# Patient Record
Sex: MALE | Race: WHITE | NOT HISPANIC OR LATINO | Employment: UNEMPLOYED | URBAN - METROPOLITAN AREA
[De-identification: names, ages, dates, MRNs, and addresses within clinical notes are randomized per-mention and may not be internally consistent; named-entity substitution may affect disease eponyms.]

---

## 2018-12-03 ENCOUNTER — OFFICE VISIT (OUTPATIENT)
Dept: FAMILY MEDICINE | Facility: CLINIC | Age: 43
End: 2018-12-03
Payer: COMMERCIAL

## 2018-12-03 VITALS
RESPIRATION RATE: 16 BRPM | DIASTOLIC BLOOD PRESSURE: 78 MMHG | WEIGHT: 201.6 LBS | BODY MASS INDEX: 25.87 KG/M2 | TEMPERATURE: 97.6 F | SYSTOLIC BLOOD PRESSURE: 133 MMHG | HEART RATE: 92 BPM | HEIGHT: 74 IN

## 2018-12-03 DIAGNOSIS — Z00.00 ENCOUNTER FOR WELL ADULT EXAM WITHOUT ABNORMAL FINDINGS: Primary | ICD-10-CM

## 2018-12-03 DIAGNOSIS — Z23 NEED FOR PROPHYLACTIC VACCINATION AND INOCULATION AGAINST INFLUENZA: ICD-10-CM

## 2018-12-03 DIAGNOSIS — F40.243 FEAR OF FLYING: ICD-10-CM

## 2018-12-03 LAB
CHOLEST SERPL-MCNC: 177 MG/DL
GLUCOSE SERPL-MCNC: 105 MG/DL (ref 70–99)
HDLC SERPL-MCNC: 50 MG/DL
LDLC SERPL CALC-MCNC: 107 MG/DL
NONHDLC SERPL-MCNC: 127 MG/DL
TRIGL SERPL-MCNC: 102 MG/DL

## 2018-12-03 PROCEDURE — 80061 LIPID PANEL: CPT | Performed by: NURSE PRACTITIONER

## 2018-12-03 PROCEDURE — 90686 IIV4 VACC NO PRSV 0.5 ML IM: CPT | Performed by: NURSE PRACTITIONER

## 2018-12-03 PROCEDURE — 99396 PREV VISIT EST AGE 40-64: CPT | Mod: 25 | Performed by: NURSE PRACTITIONER

## 2018-12-03 PROCEDURE — 36415 COLL VENOUS BLD VENIPUNCTURE: CPT | Performed by: NURSE PRACTITIONER

## 2018-12-03 PROCEDURE — 90471 IMMUNIZATION ADMIN: CPT | Performed by: NURSE PRACTITIONER

## 2018-12-03 PROCEDURE — 82947 ASSAY GLUCOSE BLOOD QUANT: CPT | Performed by: NURSE PRACTITIONER

## 2018-12-03 RX ORDER — CLONAZEPAM 0.5 MG/1
0.5 TABLET, ORALLY DISINTEGRATING ORAL 2 TIMES DAILY PRN
Qty: 10 TABLET | Refills: 0 | Status: SHIPPED | OUTPATIENT
Start: 2018-12-03 | End: 2020-06-25

## 2018-12-03 ASSESSMENT — ENCOUNTER SYMPTOMS
MYALGIAS: 0
EYE PAIN: 0
NAUSEA: 0
PARESTHESIAS: 0
ARTHRALGIAS: 0
SHORTNESS OF BREATH: 1
HEARTBURN: 0
COUGH: 0
JOINT SWELLING: 0
ABDOMINAL PAIN: 0
DIARRHEA: 0
HEMATOCHEZIA: 0
HEADACHES: 0
SORE THROAT: 0
CONSTIPATION: 0
DIZZINESS: 0
FREQUENCY: 0
FEVER: 0
CHILLS: 0
PALPITATIONS: 0
DYSURIA: 0
NERVOUS/ANXIOUS: 0
WEAKNESS: 0
HEMATURIA: 0

## 2018-12-03 NOTE — MR AVS SNAPSHOT
After Visit Summary   12/3/2018    Sunny Pruitt    MRN: [id number]           Patient Information     Date Of Birth          1975        Visit Information        Provider Department      12/3/2018 10:40 AM Joyce Jorgensen APRN [facility]        Today's Diagnoses     Encounter for well adult exam without abnormal findings    -  1    Fear of flying        Need for prophylactic vaccination and inoculation against influenza          Care Instructions      Preventive Health Recommendations  Male Ages 40 to 49    Yearly exam:             See your health care provider every year in order to  o   Review health changes.   o   Discuss preventive care.    o   Review your medicines if your doctor has prescribed any.    You should be tested each year for STDs (sexually transmitted diseases) if you re at risk.     Have a cholesterol test every 5 years.     Have a colonoscopy (test for colon cancer) if someone in your family has had colon cancer or polyps before age 50.     After age 45, have a diabetes test (fasting glucose). If you are at risk for diabetes, you should have this test every 3 years.      Talk with your health care provider about whether or not a prostate cancer screening test (PSA) is right for you.    Shots: Get a flu shot each year. Get a tetanus shot every 10 years.     Nutrition:    Eat at least 5 servings of fruits and vegetables daily.     Eat whole-grain bread, whole-wheat pasta and brown rice instead of white grains and rice.     Get adequate Calcium and Vitamin D.     Lifestyle    Exercise for at least 150 minutes a week (30 minutes a day, 5 days a week). This will help you control your weight and prevent disease.     Limit alcohol to one drink per day.     No smoking.     Wear sunscreen to prevent skin cancer.     See your dentist every six months for an exam and cleaning.              Follow-ups after your visit        Follow-up notes from your care team      "Return in about 1 year (around 12/3/2019) for Physical Exam.      Who to contact     If you have questions or need follow up information about today's clinic visit or your schedule please contact [facility] directly at [phone].  Normal or non-critical lab and imaging results will be communicated to you by MyChart, letter or phone within 4 business days after the clinic has received the results. If you do not hear from us within 7 days, please contact the clinic through [facility] or phone. If you have a critical or abnormal lab result, we will notify you by phone as soon as possible.  Submit refill requests through [employer] or call your pharmacy and they will forward the refill request to us. Please allow 3 business days for your refill to be completed.          Additional Information About Your Visit        MyChart Information     [employer] lets you send messages to your doctor, view your test results, renew your prescriptions, schedule appointments and more. To sign up, go to [email or web address] . Click on \"Log in\" on the left side of the screen, which will take you to the Welcome page. Then click on \"Sign up Now\" on the right side of the page.     You will be asked to enter the access code listed below, as well as some personal information. Please follow the directions to create your username and password.     Your access code is: [email or web address]  Expires: 3/3/2019 11:35 AM     Your access code will  in 90 days. If you need help or a new code, please call your [place] clinic or [phone].        Care EveryWhere ID     This is your Care EveryWhere ID. This could be used by other organizations to access your [place] medical records  [id number]        Your Vitals Were     Pulse Temperature Respirations Height BMI (Body Mass Index)       92 97.6  F (36.4  C) (Oral) 16 6' 2\" (1.88 m) 25.88 kg/m2        Blood Pressure from Last 3 Encounters:   18 133/78   16 102/74 "   06/04/16 120/72    Weight from Last 3 Encounters:   12/03/18 201 lb 9.6 oz (91.4 kg)   09/13/16 193 lb 6.4 oz (87.7 kg)   06/04/16 195 lb (88.5 kg)              We Performed the Following     FLU VACCINE, SPLIT VIRUS, IM (QUADRIVALENT) [90451]- >3 YRS     GLUCOSE     Lipid Profile (Chol, Trig, HDL, LDL calc)     Vaccine Administration, Initial [42134]          Today's Medication Changes          These changes are accurate as of 12/3/18 11:35 AM.  If you have any questions, ask your nurse or doctor.               Start taking these medicines.        Dose/Directions    clonazePAM 0.5 MG ODT   Commonly known as:  klonoPIN   Used for:  Fear of flying   Started by:  Jocye Jorgensen APRN CNP        Dose:  0.5 mg   Take 1 tablet (0.5 mg) by mouth 2 times daily as needed for anxiety   Quantity:  10 tablet   Refills:  0            Where to get your medicines      Some of these will need a paper prescription and others can be bought over the counter.  Ask your nurse if you have questions.     Bring a paper prescription for each of these medications     clonazePAM 0.5 MG ODT                Primary Care Provider Fax #    Physician No Ref-Primary [phone]       No address on file        Equal Access to Services     FREYA ASHFORD : Tabatha Atkins, waaxda lusumeetadaha, qaybta kaalmada ademanuelyada, rosangela jon. So [facility] [phone].    ATENCIÓN: Si habla español, tiene a covington disposición servicios gratuitos de asistencia lingüística. Llame al [phone].    We comply with applicable federal civil rights laws and [place] laws. We do not discriminate on the basis of race, color, national origin, age, disability, sex, sexual orientation, or gender identity.            Thank you!     Thank you for choosing [facility]  for your care. Our goal is always to provide you with excellent care. Hearing back from our patients is one way we can continue to improve our  services. Please take a few minutes to complete the written survey that you may receive in the mail after your visit with us. Thank you!             Your Updated Medication List - Protect others around you: Learn how to safely use, store and throw away your medicines at www.disposemymeds.org.          This list is accurate as of 12/3/18 11:35 AM.  Always use your most recent med list.                   Brand Name Dispense Instructions for use Diagnosis    clonazePAM 0.5 MG ODT    klonoPIN    10 tablet    Take 1 tablet (0.5 mg) by mouth 2 times daily as needed for anxiety    Fear of flying       IBUPROFEN PO      Take 400 mg by mouth as needed for moderate pain

## 2018-12-03 NOTE — PROGRESS NOTES

## 2018-12-03 NOTE — PROGRESS NOTES
SUBJECTIVE:   CC: Sunny Pruitt is an 43 year old male who presents for preventative health visit.     Physical   Annual:     Getting at least 3 servings of Calcium per day:  Yes    Bi-annual eye exam:  Yes    Dental care twice a year:  Yes    Sleep apnea or symptoms of sleep apnea:  None    Diet:  Regular (no restrictions)    Frequency of exercise:  4-5 days/week    Duration of exercise:  30-45 minutes    Taking medications regularly:  Yes    Medication side effects:  None    Additional concerns today:  No    PHQ-2 Total Score: 0    SOB due to anxiety.     Today's PHQ-2 Score:   PHQ-2 ( 1999 Pfizer) 12/3/2018   Q1: Little interest or pleasure in doing things 0   Q2: Feeling down, depressed or hopeless 0   PHQ-2 Score 0   Q1: Little interest or pleasure in doing things Not at all   Q2: Feeling down, depressed or hopeless Not at all   PHQ-2 Score 0       Abuse: Current or Past(Physical, Sexual or Emotional)- No  Do you feel safe in your environment? Yes    Social History   Substance Use Topics     Smoking status: Former Smoker     Types: Cigarettes     Smokeless tobacco: Never Used     Alcohol use Yes      Comment: rarely     Alcohol Use 12/3/2018   If you drink alcohol do you typically have greater than 3 drinks per day OR greater than 7 drinks per week? No     Last PSA: No results found for: PSA    Reviewed orders with patient. Reviewed health maintenance and updated orders accordingly - Yes    Reviewed and updated as needed this visit by clinical staff  Tobacco  Allergies  Meds  Problems  Med Hx  Surg Hx  Fam Hx  Soc Hx          Reviewed and updated as needed this visit by Provider  Tobacco  Allergies  Meds  Problems  Med Hx  Surg Hx  Fam Hx  Soc Hx           Review of Systems   Constitutional: Negative for chills and fever.   HENT: Positive for congestion. Negative for ear pain, hearing loss and sore throat.    Eyes: Negative for pain and visual disturbance.   Respiratory: Positive for shortness of  "breath. Negative for cough.    Cardiovascular: Negative for chest pain, palpitations and peripheral edema.   Gastrointestinal: Negative for abdominal pain, constipation, diarrhea, heartburn, hematochezia and nausea.   Genitourinary: Negative for discharge, dysuria, frequency, genital sores, hematuria, impotence and urgency.   Musculoskeletal: Negative for arthralgias, joint swelling and myalgias.   Skin: Negative for rash.   Neurological: Negative for dizziness, weakness, headaches and paresthesias.   Psychiatric/Behavioral: Negative for mood changes. The patient is not nervous/anxious.        OBJECTIVE:   /78  Pulse 108  Temp 97.6  F (36.4  C) (Oral)  Resp 16  Ht 6' 2\" (1.88 m)  Wt 201 lb 9.6 oz (91.4 kg)  BMI 25.88 kg/m2    Physical Exam  GENERAL: healthy, alert and no distress  EYES: Eyes grossly normal to inspection, PERRL and conjunctivae and sclerae normal  HENT: ear canals and TM's normal, nose and mouth without ulcers or lesions  NECK: no adenopathy, no asymmetry, masses, or scars and thyroid normal to palpation  RESP: lungs clear to auscultation - no rales, rhonchi or wheezes  CV: regular rate and rhythm, normal S1 S2, no S3 or S4, no murmur, click or rub, no peripheral edema and peripheral pulses strong  ABDOMEN: soft, nontender, no hepatosplenomegaly, no masses and bowel sounds normal  MS: no gross musculoskeletal defects noted, no edema  SKIN: no suspicious lesions or rashes  NEURO: Normal strength and tone, mentation intact and speech normal  PSYCH: mentation appears normal, affect normal/bright    Diagnostic Test Results:  none     ASSESSMENT/PLAN:       ICD-10-CM    1. Encounter for well adult exam without abnormal findings Z00.00 GLUCOSE     Lipid Profile (Chol, Trig, HDL, LDL calc)   2. Fear of flying F40.243 clonazePAM (KLONOPIN) 0.5 MG ODT   3. Need for prophylactic vaccination and inoculation against influenza Z23 FLU VACCINE, SPLIT VIRUS, IM (QUADRIVALENT) [03456]- >3 YRS     Vaccine " "Administration, Initial [31006]      well male,  fasting for labs.  Encouraged to continue exercise and healthy diet choices.      Fear of flying, has a trip to [place] over [place].   Will try 1/2 tablet 1 hour prior to flight.  Do not mix with alcohol.  May repeat x 1.      COUNSELING:   Reviewed preventive health counseling, as reflected in patient instructions    BP Readings from Last 1 Encounters:   12/03/18 133/78     Estimated body mass index is 25.88 kg/(m^2) as calculated from the following:    Height as of this encounter: 6' 2\" (1.88 m).    Weight as of this encounter: 201 lb 9.6 oz (91.4 kg).           reports that he has quit smoking. His smoking use included Cigarettes. He has never used smokeless tobacco.      Counseling Resources:  ATP IV Guidelines  Pooled Cohorts Equation Calculator  FRAX Risk Assessment  ICSI Preventive Guidelines  Dietary Guidelines for Americans, 2010  USDA's MyPlate  ASA Prophylaxis  Lung CA Screening    MATT Mccoy CNP  [facility]  "

## 2020-05-12 ENCOUNTER — VIRTUAL VISIT (OUTPATIENT)
Dept: FAMILY MEDICINE | Facility: CLINIC | Age: 45
End: 2020-05-12
Payer: COMMERCIAL

## 2020-05-12 VITALS — HEIGHT: 75 IN | BODY MASS INDEX: 24.62 KG/M2 | WEIGHT: 198 LBS

## 2020-05-12 DIAGNOSIS — J30.2 SEASONAL ALLERGIC RHINITIS, UNSPECIFIED TRIGGER: ICD-10-CM

## 2020-05-12 DIAGNOSIS — H92.09 OTALGIA, UNSPECIFIED LATERALITY: Primary | ICD-10-CM

## 2020-05-12 PROCEDURE — 99213 OFFICE O/P EST LOW 20 MIN: CPT | Mod: TEL | Performed by: FAMILY MEDICINE

## 2020-05-12 ASSESSMENT — MIFFLIN-ST. JEOR: SCORE: 1858.75

## 2020-05-12 NOTE — PROGRESS NOTES
"Sunny Pruitt is a 45 year old male who is being evaluated via a billable telephone visit.      The patient has been notified of following:     \"This telephone visit will be conducted via a call between you and your physician/provider. We have found that certain health care needs can be provided without the need for a physical exam.  This service lets us provide the care you need with a short phone conversation.  If a prescription is necessary we can send it directly to your pharmacy.  If lab work is needed we can place an order for that and you can then stop by our lab to have the test done at a later time.    Telephone visits are billed at different rates depending on your insurance coverage. During this emergency period, for some insurers they may be billed the same as an in-person visit.  Please reach out to your insurance provider with any questions.    If during the course of the call the physician/provider feels a telephone visit is not appropriate, you will not be charged for this service.\"    Patient has given verbal consent for Telephone visit?  Yes    What phone number would you like to be contacted at? [phone]    How would you like to obtain your AVS? E-Mail (inform patient AVS not encrypted)    Subjective     Sunny Pruitt is a 45 year old male who presents to clinic today for the following health issues:    HPI  Hx of seasonal allergies currently using Claritin daily.  Has had more increased ear pain and pressure in past days.  States he is concerned about an ear infection as the pressure in his ears has now changed to more of a pain.  No ear drainage.  No sinus pain or pressure.  No HA.  No fever.           Patient Active Problem List   Diagnosis     CARDIOVASCULAR SCREENING; LDL GOAL LESS THAN 160     History reviewed. No pertinent surgical history.    Social History     Tobacco Use     Smoking status: Former Smoker     Types: Cigarettes     Smokeless tobacco: Never Used   Substance Use Topics     " Alcohol use: Yes     Comment: rarely     Family History   Problem Relation Age of Onset     Cancer Sister         cancer of tailbone         Current Outpatient Medications   Medication Sig Dispense Refill     clonazePAM (KLONOPIN) 0.5 MG ODT Take 1 tablet (0.5 mg) by mouth 2 times daily as needed for anxiety 10 tablet 0     IBUPROFEN PO Take 400 mg by mouth as needed for moderate pain       No Known Allergies  Recent Labs   Lab Test 12/03/18  1115   *   HDL 50   TRIG 102      BP Readings from Last 3 Encounters:   12/03/18 133/78   09/13/16 102/74   06/04/16 120/72    Wt Readings from Last 3 Encounters:   05/12/20 89.8 kg (198 lb)   12/03/18 91.4 kg (201 lb 9.6 oz)   09/13/16 87.7 kg (193 lb 6.4 oz)                    Reviewed and updated as needed this visit by Provider         Review of Systems   Constitutional, HEENT, cardiovascular, pulmonary, gi and gu systems are negative, except as otherwise noted.       Objective   Reported vitals:  There were no vitals taken for this visit.   healthy, alert and no distress  PSYCH: Alert and oriented times 3; coherent speech, normal   rate and volume, able to articulate logical thoughts, able   to abstract reason, no tangential thoughts, no hallucinations   or delusions  His affect is normal and pleasant  RESP: No cough, no audible wheezing, able to talk in full sentences  Remainder of exam unable to be completed due to telephone visits            Assessment/Plan:  1. Otalgia, unspecified laterality  2. Seasonal allergic rhinitis, unspecified trigger  Recommend increasing Claritin to bid dosing and adding FLonase or a Mucinex or Sudafed to help reduce fluid pressure and buildup.  May use ibuprofen and heat to affected ear prn.  If no change or worsening symptoms not relieved by recommendation above, recommend coming in for otoscopic exam for further eval prn.    Phone call duration:  11 minutes    Gini Mario MD

## 2020-06-25 ENCOUNTER — OFFICE VISIT (OUTPATIENT)
Dept: FAMILY MEDICINE | Facility: CLINIC | Age: 45
End: 2020-06-25
Payer: COMMERCIAL

## 2020-06-25 VITALS
WEIGHT: 201 LBS | TEMPERATURE: 98.8 F | DIASTOLIC BLOOD PRESSURE: 81 MMHG | RESPIRATION RATE: 16 BRPM | HEIGHT: 75 IN | OXYGEN SATURATION: 98 % | SYSTOLIC BLOOD PRESSURE: 138 MMHG | HEART RATE: 55 BPM | BODY MASS INDEX: 24.99 KG/M2

## 2020-06-25 DIAGNOSIS — Z01.818 PREOP GENERAL PHYSICAL EXAM: Primary | ICD-10-CM

## 2020-06-25 DIAGNOSIS — S83.207A TEAR OF MENISCUS OF LEFT KNEE AS CURRENT INJURY, UNSPECIFIED MENISCUS, UNSPECIFIED TEAR TYPE, INITIAL ENCOUNTER: ICD-10-CM

## 2020-06-25 LAB
ANION GAP SERPL CALCULATED.3IONS-SCNC: 2 MMOL/L (ref 3–14)
BASOPHILS # BLD AUTO: 0 10E9/L (ref 0–0.2)
BASOPHILS NFR BLD AUTO: 0.5 %
BUN SERPL-MCNC: 16 MG/DL (ref 7–30)
CALCIUM SERPL-MCNC: 9.1 MG/DL (ref 8.5–10.1)
CHLORIDE SERPL-SCNC: 107 MMOL/L (ref 94–109)
CO2 SERPL-SCNC: 28 MMOL/L (ref 20–32)
CREAT SERPL-MCNC: 1.19 MG/DL (ref 0.66–1.25)
DIFFERENTIAL METHOD BLD: NORMAL
EOSINOPHIL # BLD AUTO: 0.1 10E9/L (ref 0–0.7)
EOSINOPHIL NFR BLD AUTO: 1.6 %
ERYTHROCYTE [DISTWIDTH] IN BLOOD BY AUTOMATED COUNT: 12.1 % (ref 10–15)
GFR SERPL CREATININE-BSD FRML MDRD: 72 ML/MIN/{1.73_M2}
GLUCOSE SERPL-MCNC: 88 MG/DL (ref 70–99)
HCT VFR BLD AUTO: 43.5 % (ref 40–53)
HGB BLD-MCNC: 15.1 G/DL (ref 13.3–17.7)
LYMPHOCYTES # BLD AUTO: 2.2 10E9/L (ref 0.8–5.3)
LYMPHOCYTES NFR BLD AUTO: 36.4 %
MCH RBC QN AUTO: 30.7 PG (ref 26.5–33)
MCHC RBC AUTO-ENTMCNC: 34.7 G/DL (ref 31.5–36.5)
MCV RBC AUTO: 88 FL (ref 78–100)
MONOCYTES # BLD AUTO: 0.8 10E9/L (ref 0–1.3)
MONOCYTES NFR BLD AUTO: 13.8 %
NEUTROPHILS # BLD AUTO: 2.9 10E9/L (ref 1.6–8.3)
NEUTROPHILS NFR BLD AUTO: 47.7 %
PLATELET # BLD AUTO: 205 10E9/L (ref 150–450)
POTASSIUM SERPL-SCNC: 4.1 MMOL/L (ref 3.4–5.3)
RBC # BLD AUTO: 4.92 10E12/L (ref 4.4–5.9)
SODIUM SERPL-SCNC: 137 MMOL/L (ref 133–144)
WBC # BLD AUTO: 6.1 10E9/L (ref 4–11)

## 2020-06-25 PROCEDURE — 80048 BASIC METABOLIC PNL TOTAL CA: CPT | Performed by: PHYSICIAN ASSISTANT

## 2020-06-25 PROCEDURE — 99214 OFFICE O/P EST MOD 30 MIN: CPT | Performed by: PHYSICIAN ASSISTANT

## 2020-06-25 PROCEDURE — 85025 COMPLETE CBC W/AUTO DIFF WBC: CPT | Performed by: PHYSICIAN ASSISTANT

## 2020-06-25 PROCEDURE — 36415 COLL VENOUS BLD VENIPUNCTURE: CPT | Performed by: PHYSICIAN ASSISTANT

## 2020-06-25 ASSESSMENT — MIFFLIN-ST. JEOR: SCORE: 1872.36

## 2020-06-25 NOTE — PROGRESS NOTES
[facility] [address] [place] [address]  [phone]  Dept: [phone]    PRE-OP EVALUATION:  Today's date: 2020    Sunny Pruitt (: 1975) presents for pre-operative evaluation assessment as requested by Dr. Rashard Wyatt.  He requires evaluation and anesthesia risk assessment prior to undergoing surgery/procedure for treatment of left knee meniscus injury .    Fax number for surgical facility: [phone]  Primary Physician: No Ref-Primary, Physician  Type of Anesthesia Anticipated: to be determined    Patient has a Health Care Directive or Living Will:  NO    Preop Questions 2020   Who is doing your surgery? Dr. Rashard Wyatt   What are you having done? Arthroscopic surgery, left knee   Date of Surgery/Procedure: 2020   Facility or Hospital where procedure/surgery will be performed: [facility]   1.  Do you have a history of Heart attack, stroke, stent, coronary bypass surgery, or other heart surgery? No   2.  Do you ever have any pain or discomfort in your chest? No   3.  Do you have a history of  Heart Failure? No   4.   Are you troubled by shortness of breath when:  walking on a level surface, or up a slight hill, or at night? No   5.  Do you currently have a cold, bronchitis or other respiratory infection? No   6.  Do you have a cough, shortness of breath, or wheezing? No   7.  Do you sometimes get pains in the calves of your legs when you walk? No   8. Do you or anyone in your family have previous history of blood clots? No   9.  Do you or does anyone in your family have a serious bleeding problem such as prolonged bleeding following surgeries or cuts? No   10. Have you ever had problems with anemia or been told to take iron pills? No   11. Have you had any abnormal blood loss such as black, tarry or bloody stools? No   12. Have you ever had a blood transfusion? No   13. Have you or any of your relatives ever had problems with anesthesia?  No   14. Do you have sleep apnea, excessive snoring or daytime drowsiness? No   15. Do you have any prosthetic heart valves? No   16. Do you have prosthetic joints? No         HPI:     HPI related to upcoming procedure:     Left meniscal tear   3 weeks ago injury occurred   H/o meniscal tear with repair same knee in high school  Associated symptoms include mild swelling which has improved   Pain is constant dull ache   Sometimes left knee will catch and click     Normally very active bicycling and hiking. Eager to have surgery done and start recovery process.        See problem list for active medical problems.  Problems all longstanding and stable, except as noted/documented.  See ROS for pertinent symptoms related to these conditions.      MEDICAL HISTORY:     Patient Active Problem List    Diagnosis Date Noted     CARDIOVASCULAR SCREENING; LDL GOAL LESS THAN 160 04/12/2016     Priority: Medium      History reviewed. No pertinent past medical history.  History reviewed. No pertinent surgical history.  Current Outpatient Medications   Medication Sig Dispense Refill     IBUPROFEN PO Take 400 mg by mouth as needed for moderate pain       OTC products: None, except as noted above    No Known Allergies   Latex Allergy: NO    Social History     Tobacco Use     Smoking status: Former Smoker     Types: Cigarettes     Smokeless tobacco: Never Used   Substance Use Topics     Alcohol use: Yes     Comment: rarely     History   Drug Use No       REVIEW OF SYSTEMS:   CONSTITUTIONAL: NEGATIVE for fever, chills, change in weight  INTEGUMENTARY/SKIN: NEGATIVE for worrisome rashes, moles or lesions  EYES: NEGATIVE for vision changes or irritation  ENT/MOUTH: NEGATIVE for ear, mouth and throat problems  RESP: NEGATIVE for significant cough or SOB  BREAST: NEGATIVE for masses, tenderness or discharge  CV: NEGATIVE for chest pain, palpitations or peripheral edema  GI: NEGATIVE for nausea, abdominal pain, heartburn, or change in bowel  "habits  : NEGATIVE for frequency, dysuria, or hematuria  MUSCULOSKELETAL: NEGATIVE for significant arthralgias or myalgia  NEURO: NEGATIVE for weakness, dizziness or paresthesias  ENDOCRINE: NEGATIVE for temperature intolerance, skin/hair changes  HEME: NEGATIVE for bleeding problems  PSYCHIATRIC: NEGATIVE for changes in mood or affect    EXAM:   /81 (BP Location: Right arm, Patient Position: Sitting, Cuff Size: Adult Regular)   Pulse 55   Temp 98.8  F (37.1  C) (Oral)   Resp 16   Ht 1.905 m (6' 3\")   Wt 91.2 kg (201 lb)   SpO2 98%   BMI 25.12 kg/m      GENERAL APPEARANCE: healthy, alert and no distress     EYES: EOMI,  PERRL     HENT: ear canals and TM's normal and nose and mouth without ulcers or lesions     NECK: no adenopathy, no asymmetry, masses, or scars and thyroid normal to palpation     RESP: lungs clear to auscultation - no rales, rhonchi or wheezes     CV: regular rates and rhythm, normal S1 S2, no S3 or S4 and no murmur, click or rub     ABDOMEN:  soft, nontender, no HSM or masses and bowel sounds normal     MS: extremities normal- no gross deformities noted, no evidence of inflammation in joints, FROM in all extremities.     SKIN: no suspicious lesions or rashes     NEURO: Normal strength and tone, sensory exam grossly normal, mentation intact and speech normal     PSYCH: mentation appears normal. and affect normal/bright     LYMPHATICS: No cervical adenopathy    DIAGNOSTICS:     Labs Resulted Today:   Results for orders placed or performed in visit on 06/25/20   CBC with platelets and differential     Status: None   Result Value Ref Range    WBC 6.1 4.0 - 11.0 10e9/L    RBC Count 4.92 4.4 - 5.9 10e12/L    Hemoglobin 15.1 13.3 - 17.7 g/dL    Hematocrit 43.5 40.0 - 53.0 %    MCV 88 78 - 100 fl    MCH 30.7 26.5 - 33.0 pg    MCHC 34.7 31.5 - 36.5 g/dL    RDW 12.1 10.0 - 15.0 %    Platelet Count 205 150 - 450 10e9/L    % Neutrophils 47.7 %    % Lymphocytes 36.4 %    % Monocytes 13.8 %    % " Eosinophils 1.6 %    % Basophils 0.5 %    Absolute Neutrophil 2.9 1.6 - 8.3 10e9/L    Absolute Lymphocytes 2.2 0.8 - 5.3 10e9/L    Absolute Monocytes 0.8 0.0 - 1.3 10e9/L    Absolute Eosinophils 0.1 0.0 - 0.7 10e9/L    Absolute Basophils 0.0 0.0 - 0.2 10e9/L    Diff Method Automated Method        No results for input(s): HGB, PLT, INR, NA, POTASSIUM, CR, A1C in the last 95411 hours.     IMPRESSION:   Reason for surgery/procedure: left knee meniscal tear  Diagnosis/reason for consult: preoperative exam prior to arthroscopic left meniscus repair     The proposed surgical procedure is considered INTERMEDIATE risk.    REVISED CARDIAC RISK INDEX  The patient has the following serious cardiovascular risks for perioperative complications such as (MI, PE, VFib and 3  AV Block):  No serious cardiac risks  INTERPRETATION: 0 risks: Class I (very low risk - 0.4% complication rate)    The patient has the following additional risks for perioperative complications:  No identified additional risks      ICD-10-CM    1. Preop general physical exam  Z01.818 CBC with platelets and differential     Basic metabolic panel  (Ca, Cl, CO2, Creat, Gluc, K, Na, BUN)   2. Tear of meniscus of left knee as current injury, unspecified meniscus, unspecified tear type, initial encounter  S83.207A        RECOMMENDATIONS:     --Consult hospital rounder / IM to assist post-op medical management    --Patient is to take all scheduled medications on the day of surgery EXCEPT for modifications listed below. Has already held ibuprofen.     APPROVAL GIVEN to proceed with proposed procedure, without further diagnostic evaluation       Signed Electronically by: Berto Gonzalez PA-C    Copy of this evaluation report is provided to requesting physician.    [place] Preop Guidelines    Revised Cardiac Risk Index

## 2020-08-17 ENCOUNTER — OFFICE VISIT (OUTPATIENT)
Dept: FAMILY MEDICINE | Facility: CLINIC | Age: 45
End: 2020-08-17
Payer: COMMERCIAL

## 2020-08-17 VITALS
SYSTOLIC BLOOD PRESSURE: 121 MMHG | HEART RATE: 77 BPM | WEIGHT: 199 LBS | HEIGHT: 74 IN | OXYGEN SATURATION: 97 % | DIASTOLIC BLOOD PRESSURE: 73 MMHG | BODY MASS INDEX: 25.54 KG/M2 | TEMPERATURE: 98.6 F

## 2020-08-17 DIAGNOSIS — M25.572 LEFT ANKLE PAIN, UNSPECIFIED CHRONICITY: ICD-10-CM

## 2020-08-17 DIAGNOSIS — Z01.818 PREOP GENERAL PHYSICAL EXAM: Primary | ICD-10-CM

## 2020-08-17 PROCEDURE — 99214 OFFICE O/P EST MOD 30 MIN: CPT | Performed by: FAMILY MEDICINE

## 2020-08-17 ASSESSMENT — MIFFLIN-ST. JEOR: SCORE: 1847.41

## 2020-08-17 NOTE — PATIENT INSTRUCTIONS
"Ok to use tylenol.   No Advil or Aleve 3 days before surgery.  No Aspirin 7 days before surgery.          Preparing for Your Surgery  Getting started  A surgery nurse will call you to review your health history and instructions. They will give you an arrival time based on your scheduled surgery time.  Please be ready to share the following:    Your doctor's clinic name and phone number    Your medical, surgical and anesthesia history    A list of allergies and sensitivities    A list of medicines, including herbal treatments and over-the-counter drugs    Whether the patient has a legal guardian (ask how to send us the papers in advance)  If your child is having surgery, please ask for a copy of Preparing for Your Child's Surgery.    Preparing for surgery    Within 30 days of surgery: Have an exam at your family clinic (primary care clinic), or go to a pre-operative clinic. This exam is called a \"History and Physical,\" or H&P.    At your H&P exam, talk to your care team about all medicines you take. If you need to stop any medicines before surgery, ask when to start taking them again.  ? We do this for your safety. Many medicines can make you bleed too much during surgery. Some change how well surgery (anesthesia) drugs work.    Call your insurance company to see what it will and won't pay for. Ask if they need to pre-approve the surgery. (If no insurance, call [phone].)    Call your surgeon's clinic if there's any change in your health. This includes signs of a cold or flu (sore throat, runny nose, cough, rash, fever). It also includes a scrape or scratch near the surgery site.    If you have questions on the day of surgery, call your surgery center.  Eating and drinking guidelines  For your safety: Unless your surgeon tells you otherwise, follow the guidelines below.    Eat and drink as usual until 8 hours before surgery. After that, no food or milk.    Drink clear liquids until 2 hours before surgery. These " are liquids you can see through, like water, Gatorade and Propel Water. You may also have black coffee and tea (no cream or milk).    Nothing by mouth within 2 hours of surgery. This includes gum, candy and breath mints.    Stop alcohol the midnight before surgery.    If your family clinic tells you to take medicine on the morning of surgery, it's okay to take it with a sip of water.  Preventing infection    Shower or bathe the night before and morning of your surgery. Follow the instructions your clinic gave you. (If no instructions, use regular soap.)    Don't shave or clip hair near your surgery site. This can lead to skin infection.    Don't smoke the morning of surgery. Smoking increases the risk of infection. You may chew nicotine gum up to 2 hours before surgery. A nicotine patch is okay.  ? Note: Some surgeries require you to completely quit smoking and nicotine. Check with your surgeon.    Your care team will make every effort to keep you safe from infection. We will:  ? Clean our hands often with soap and water (or an alcohol-based hand rub).  ? Clean the skin at your surgery site with a special soap that kills germs. We'll also remove hair from the site as needed.  ? Wear special hair covers, masks, gowns and gloves during surgery.  ? Give antibiotic medicine, if prescribed. Not all surgeries need antibiotics.  What to bring on the day of surgery    Photo ID and insurance card    Copy of your health care directive, if you have one    Glasses and hearing aides (bring cases)  ? You can't wear contacts during surgery    Inhaler and eye drops, if you use them (tell us about these when you arrive)    CPAP machine or breathing device, if you use them    A few personal items, if spending the night    If you have . . .  ? A pacemaker or ICD (cardiac defibrillator): Bring the ID card.  ? An implanted stimulator: Bring the remote control.  ? A legal guardian: Bring a copy of the certified (court-stamped)  guardianship papers.  Please remove any jewelry, including body piercings. Leave jewelry and other valuables at home.  If you're going home the day of surgery  Important: If you don't follow the rules below, we must cancel your surgery.     Arrange for someone to drive you home after surgery. You may not drive, take a taxi or take public transportation by yourself (unless you'll have local anesthesia only).    Arrange for a responsible adult to stay with you overnight. If you don't, we may keep you in the hospital overnight, and you may need to pay the costs yourself.  Questions?   If you have any questions for your care team, list them here: _________________________________________________________________________________________________________________________________________________________________________________________________________________________________________________________________________________________________________________________  For informational purposes only. Not to replace the advice of your health care provider. Copyright   7707-5921 [employer] [facility] [employer]. All rights reserved. Clinically reviewed by Cammie Sage MD. SMARTworks [id number] - REV 07/19.    Preparing for Your Surgery  Getting started  A surgery nurse will call you to review your health history and instructions. They will give you an arrival time based on your scheduled surgery time.  Please be ready to share the following:    Your doctor's clinic name and phone number    Your medical, surgical and anesthesia history    A list of allergies and sensitivities    A list of medicines, including herbal treatments and over-the-counter drugs    Whether the patient has a legal guardian (ask how to send us the papers in advance)  If your child is having surgery, please ask for a copy of Preparing for Your Child's Surgery.    Preparing for surgery    Within 30 days of surgery: Have an exam at your family clinic (primary care clinic), or go  "to a pre-operative clinic. This exam is called a \"History and Physical,\" or H&P.    At your H&P exam, talk to your care team about all medicines you take. If you need to stop any medicines before surgery, ask when to start taking them again.  ? We do this for your safety. Many medicines can make you bleed too much during surgery. Some change how well surgery (anesthesia) drugs work.    Call your insurance company to see what it will and won't pay for. Ask if they need to pre-approve the surgery. (If no insurance, call [phone].)    Call your surgeon's clinic if there's any change in your health. This includes signs of a cold or flu (sore throat, runny nose, cough, rash, fever). It also includes a scrape or scratch near the surgery site.    If you have questions on the day of surgery, call your surgery center.  Eating and drinking guidelines  For your safety: Unless your surgeon tells you otherwise, follow the guidelines below.    Eat and drink as usual until 8 hours before surgery. After that, no food or milk.    Drink clear liquids until 2 hours before surgery. These are liquids you can see through, like water, Gatorade and Propel Water. You may also have black coffee and tea (no cream or milk).    Nothing by mouth within 2 hours of surgery. This includes gum, candy and breath mints.    Stop alcohol the midnight before surgery.    If your family clinic tells you to take medicine on the morning of surgery, it's okay to take it with a sip of water.  Preventing infection    Shower or bathe the night before and morning of your surgery. Follow the instructions your clinic gave you. (If no instructions, use regular soap.)    Don't shave or clip hair near your surgery site. This can lead to skin infection.    Don't smoke the morning of surgery. Smoking increases the risk of infection. You may chew nicotine gum up to 2 hours before surgery. A nicotine patch is okay.  ? Note: Some surgeries require you to completely " quit smoking and nicotine. Check with your surgeon.    Your care team will make every effort to keep you safe from infection. We will:  ? Clean our hands often with soap and water (or an alcohol-based hand rub).  ? Clean the skin at your surgery site with a special soap that kills germs. We'll also remove hair from the site as needed.  ? Wear special hair covers, masks, gowns and gloves during surgery.  ? Give antibiotic medicine, if prescribed. Not all surgeries need antibiotics.  What to bring on the day of surgery    Photo ID and insurance card    Copy of your health care directive, if you have one    Glasses and hearing aides (bring cases)  ? You can't wear contacts during surgery    Inhaler and eye drops, if you use them (tell us about these when you arrive)    CPAP machine or breathing device, if you use them    A few personal items, if spending the night    If you have . . .  ? A pacemaker or ICD (cardiac defibrillator): Bring the ID card.  ? An implanted stimulator: Bring the remote control.  ? A legal guardian: Bring a copy of the certified (court-stamped) guardianship papers.  Please remove any jewelry, including body piercings. Leave jewelry and other valuables at home.  If you're going home the day of surgery  Important: If you don't follow the rules below, we must cancel your surgery.     Arrange for someone to drive you home after surgery. You may not drive, take a taxi or take public transportation by yourself (unless you'll have local anesthesia only).    Arrange for a responsible adult to stay with you overnight. If you don't, we may keep you in the hospital overnight, and you may need to pay the costs yourself.  Questions?   If you have any questions for your care team, list them here:  _________________________________________________________________________________________________________________________________________________________________________________________________________________________________________________________________________________________________________________________  For informational purposes only. Not to replace the advice of your health care provider. Copyright   9972-7432 [facility]. All rights reserved. Clinically reviewed by Cammie Sage MD. SMARTworks [id number] - REV 07/19.

## 2020-08-17 NOTE — PROGRESS NOTES
[facility] [address] [place] [address]  [phone]  Dept: [phone]    PRE-OP EVALUATION:  Today's date: 2020    Sunny Pruitt (: 1975) presents for pre-operative evaluation assessment as requested by Dr. tinsley.  He requires evaluation and anesthesia risk assessment prior to undergoing surgery/procedure for treatment of left ankle  .    Proposed Surgery/ Procedure:  left ankle   Date of Surgery/ Procedure: 2019  Time of Surgery/ Procedure: 11:00am   Hospital/Surgical Facility: [facility]   Surgery Fax Number: [phone]  Primary Physician: Berto Gonzalez  Type of Anesthesia Anticipated: unknown     Preoperative Questionnaire:   No - Have you ever had a heart attack or stroke?  No - Have you ever had surgery on your heart or blood vessels, such as a stent, coronary (heart) bypass, or surgery on an artery in the head, neck, heart, or legs?  No - Do you have chest pain when you are physically active?  No - Do you have a history of heart failure?  No - Do you currently have a cold, bronchitis, or symptoms of other respiratory (head and chest) infections?  No - Do you have a cough, shortness of breath, or wheezing?  No - Do you or anyone in your family have a history of blood clots?  No - Do you or anyone in your family have a serious bleeding problem, such as long-lasting bleeding after surgeries or cuts?  No - Have you ever had anemia or been told to take iron pills?  No - Have you had any abnormal blood loss such as black, tarry or bloody stools, or abnormal vaginal bleeding?  No - Have you ever had a blood transfusion?  Yes - Are you willing to have a blood transfusion if it is medically needed before, during, or after your surgery?  No - Have you or anyone in your family ever had problems with anesthesia (sedation for surgery)?  No - Do you have sleep apnea, excessive snoring, or daytime drowsiness?   No - Do you have any artifical heart  valves or other implanted medical devices, such as a pacemaker, defibrillator, or continuous glucose monitor?  No - Do you have any artifical joints?  No - Are you allergic to latex?  No - Is there any chance that you may be pregnant?    Patient has a Health Care Directive or Living Will:  NO    HPI:     HPI related to upcoming procedure:     Left ankle - Peroneal subluxation: Pain with activity.       See problem list for active medical problems.  Problems all longstanding and stable, except as noted/documented.  See ROS for pertinent symptoms related to these conditions.      MEDICAL HISTORY:     Patient Active Problem List    Diagnosis Date Noted     CARDIOVASCULAR SCREENING; LDL GOAL LESS THAN 160 04/12/2016     Priority: Medium      History reviewed. No pertinent past medical history.  History reviewed. No pertinent surgical history.  Current Outpatient Medications   Medication Sig Dispense Refill     IBUPROFEN PO Take 400 mg by mouth as needed for moderate pain       OTC products: None, except as noted above    No Known Allergies   Latex Allergy: NO    Social History     Tobacco Use     Smoking status: Former Smoker     Types: Cigarettes     Smokeless tobacco: Never Used   Substance Use Topics     Alcohol use: Yes     Comment: rarely     History   Drug Use No       REVIEW OF SYSTEMS:   CONSTITUTIONAL: NEGATIVE for fever, chills, change in weight  INTEGUMENTARY/SKIN: NEGATIVE for worrisome rashes, moles or lesions  EYES: NEGATIVE for vision changes or irritation  ENT/MOUTH: NEGATIVE for ear, mouth and throat problems  RESP: NEGATIVE for significant cough or SOB  BREAST: NEGATIVE for masses, tenderness or discharge  CV: NEGATIVE for chest pain, palpitations or peripheral edema  GI: NEGATIVE for nausea, abdominal pain, heartburn, or change in bowel habits  : NEGATIVE for frequency, dysuria, or hematuria  MUSCULOSKELETAL: see above   NEURO: NEGATIVE for weakness, dizziness or paresthesias  ENDOCRINE: NEGATIVE  for temperature intolerance, skin/hair changes  HEME: NEGATIVE for bleeding problems  PSYCHIATRIC: NEGATIVE for changes in mood or affect    EXAM:   There were no vitals taken for this visit.    GENERAL APPEARANCE: healthy, alert and no distress     EYES: EOMI     HENT: ear canals and TM's normal and nose and mouth without ulcers or lesions     NECK: no adenopathy, no asymmetry, masses, or scars and thyroid normal to palpation     RESP: lungs clear to auscultation - no rales, rhonchi or wheezes     CV: regular rates and rhythm, normal S1 S2     ABDOMEN:  soft, nontender, no HSM or masses and bowel sounds normal     MS: extremities normal- no gross deformities noted     SKIN: no suspicious lesions or rashes     NEURO: Normal strength and tone, sensory exam grossly normal, mentation intact and speech normal     PSYCH: mentation appears normal. and affect normal/bright     LYMPHATICS: No cervical adenopathy    DIAGNOSTICS:   EKG: Not indicated due to non-vascular surgery and low risk of event (age <65 and without cardiac risk factors)    Recent Labs   Lab Test 06/25/20  1026   HGB 15.1         POTASSIUM 4.1   CR 1.19        IMPRESSION:   Reason for surgery/procedure: ankle pain  Diagnosis/reason for consult: pre-op     The proposed surgical procedure is considered INTERMEDIATE risk.    REVISED CARDIAC RISK INDEX  The patient has the following serious cardiovascular risks for perioperative complications such as (MI, PE, VFib and 3  AV Block):  No serious cardiac risks  INTERPRETATION: 0 risks: Class I (very low risk - 0.4% complication rate)    The patient has the following additional risks for perioperative complications:  No identified additional risks      ICD-10-CM    1. Preop general physical exam  Z01.818      2. Left ankle pain, unspecified chronicity      RECOMMENDATIONS:     --Consult hospital rounder / IM to assist post-op medical management if overnight    Up to date with Tdap 01/2012    No Advil  or Aleve 3 days before surgery.  No Aspirin 7 days before surgery.    --Patient is on no chronic medications    APPROVAL GIVEN to proceed with proposed procedure, without further diagnostic evaluation       Signed Electronically by: Kaleigh Desir MD    Copy of this evaluation report is provided to requesting physician.    [facility] Preop Guidelines    Revised Cardiac Risk Index

## 2020-12-27 ENCOUNTER — HEALTH MAINTENANCE LETTER (OUTPATIENT)
Age: 45
End: 2020-12-27

## 2021-10-09 ENCOUNTER — HEALTH MAINTENANCE LETTER (OUTPATIENT)
Age: 46
End: 2021-10-09

## 2022-01-23 ENCOUNTER — HEALTH MAINTENANCE LETTER (OUTPATIENT)
Age: 47
End: 2022-01-23

## 2022-02-01 ASSESSMENT — ANXIETY QUESTIONNAIRES
2. NOT BEING ABLE TO STOP OR CONTROL WORRYING: NOT AT ALL
5. BEING SO RESTLESS THAT IT IS HARD TO SIT STILL: NOT AT ALL
7. FEELING AFRAID AS IF SOMETHING AWFUL MIGHT HAPPEN: NOT AT ALL
GAD7 TOTAL SCORE: 0
3. WORRYING TOO MUCH ABOUT DIFFERENT THINGS: NOT AT ALL
GAD7 TOTAL SCORE: 0
1. FEELING NERVOUS, ANXIOUS, OR ON EDGE: NOT AT ALL
6. BECOMING EASILY ANNOYED OR IRRITABLE: NOT AT ALL
4. TROUBLE RELAXING: NOT AT ALL
GAD7 TOTAL SCORE: 0
7. FEELING AFRAID AS IF SOMETHING AWFUL MIGHT HAPPEN: NOT AT ALL

## 2022-02-01 ASSESSMENT — ENCOUNTER SYMPTOMS
HOARSE VOICE: 0
SINUS PAIN: 0
TASTE DISTURBANCE: 0
MUSCLE CRAMPS: 0
ARTHRALGIAS: 0
SORE THROAT: 0
SMELL DISTURBANCE: 0
TROUBLE SWALLOWING: 0
SINUS CONGESTION: 0
BACK PAIN: 0
STIFFNESS: 1
NECK PAIN: 0
MYALGIAS: 0
NECK MASS: 0
MUSCLE WEAKNESS: 0
JOINT SWELLING: 0

## 2022-02-02 ASSESSMENT — ANXIETY QUESTIONNAIRES: GAD7 TOTAL SCORE: 0

## 2022-02-03 ENCOUNTER — VIRTUAL VISIT (OUTPATIENT)
Dept: INTERNAL MEDICINE | Facility: CLINIC | Age: 47
End: 2022-02-03
Payer: COMMERCIAL

## 2022-02-03 DIAGNOSIS — Z00.00 ENCOUNTER FOR PREVENTATIVE ADULT HEALTH CARE EXAMINATION: Primary | ICD-10-CM

## 2022-02-03 PROCEDURE — 99207 PR NO CHARGE LOS: CPT

## 2022-02-03 RX ORDER — MULTIVIT-MIN/FOLIC ACID/BIOTIN 400-400MCG
1 CAPSULE ORAL DAILY
COMMUNITY
End: 2022-04-21

## 2022-02-03 RX ORDER — LORATADINE 10 MG/1
10 TABLET ORAL DAILY
COMMUNITY
End: 2023-06-01

## 2022-02-03 NOTE — PROGRESS NOTES
Health Maintenance:  Do you have a PCP? No  When was your last visit with your PCP?   When was your last eye exam? 1.5 years  Have you ever had a colonoscopy? No  If yes, when?   Have you ever had any polyps removed?     As part of your visit we will set up a DEXA scan which will measure your body composition. We have a few questions that need to be answered before we can schedule this scan:   What is your approximate weight? 195   Have you ever had a DEXA scan within the past 2 years? No   Will you have any other imaging studies with contrast (x-ray, CT scan) within 7 days of this appointment? No   Have you had any spine or hip surgery? No   Do you take any vitamins that contain calcium or antacids with calcium? Yes    If yes, stop taking 24 hours prior to visit.     Goals for the Visit:  1. Thorough Comprehensive Preventive Exam  2. Vas. referral  3. Ongoing feet/ tendon issues, 3 years  Pertinent past Medical/Family and Social HX:   Pertinent sx that desire are addressed with this visit:     Answers for HPI/ROS submitted by the patient on 2/1/2022  MICHAEL 7 TOTAL SCORE: 0  General Symptoms: No  Skin Symptoms: No  HENT Symptoms: Yes  EYE SYMPTOMS: No  HEART SYMPTOMS: No  LUNG SYMPTOMS: No  INTESTINAL SYMPTOMS: No  URINARY SYMPTOMS: No  REPRODUCTIVE SYMPTOMS: No  SKELETAL SYMPTOMS: Yes  BLOOD SYMPTOMS: No  NERVOUS SYSTEM SYMPTOMS: No  MENTAL HEALTH SYMPTOMS: No  Ear pain: No  Ear discharge: No  Hearing loss: No  Tinnitus: Yes  Nosebleeds: No  Congestion: No  Sinus pain: No  Trouble swallowing: No   Voice hoarseness: No  Mouth sores: No  Sore throat: No  Tooth pain: No  Gum tenderness: No  Bleeding gums: No  Change in taste: No  Change in sense of smell: No  Dry mouth: No  Hearing aid used: No  Neck lump: No  Back pain: No  Muscle aches: No  Neck pain: No  Swollen joints: No  Joint pain: No  Bone pain: Yes  Muscle cramps: No  Muscle weakness: No  Joint stiffness: Yes  Bone fracture: No        Instructions prior to  appointment:   1. Fast beginning at 10 pm for lab appointment  2. If your preventive care assessment package includes a Fitness Assessment, please bring athletic shoes. Complementary [employer] [facility] [employer] fitness attire is provided and yours to keep.  3. If eye exam, eyes may be dilated, it will last 4-6 hours, may want to bring sunglasses.   4. May bring laptop or other work materials for use during downtime.   5. You will receive an email about 3 days prior to your visit with a final itinerary, menu selections for the complementary breakfast and lunch and instructions for the visit.     Complimentary  Parking provided. Drop off car in front of [facility], take the patient elevators to the [facility]. When you enter in the lobby, identify yourself as an [facility] [atient and you will be escorted up to the clinic.   If questions arise prior to your appointment please contact the clinic at [phone].

## 2022-02-09 ENCOUNTER — HOSPITAL ENCOUNTER (EMERGENCY)
Facility: CLINIC | Age: 47
Discharge: HOME | End: 2022-02-09
Attending: STUDENT IN AN ORGANIZED HEALTH CARE EDUCATION/TRAINING PROGRAM | Admitting: STUDENT IN AN ORGANIZED HEALTH CARE EDUCATION/TRAINING PROGRAM
Payer: COMMERCIAL

## 2022-02-09 VITALS
HEART RATE: 51 BPM | RESPIRATION RATE: 14 BRPM | BODY MASS INDEX: 24.25 KG/M2 | DIASTOLIC BLOOD PRESSURE: 75 MMHG | SYSTOLIC BLOOD PRESSURE: 115 MMHG | HEIGHT: 75 IN | WEIGHT: 195 LBS | OXYGEN SATURATION: 99 % | TEMPERATURE: 97.5 F

## 2022-02-09 DIAGNOSIS — H81.10 BENIGN PAROXYSMAL POSITIONAL VERTIGO, UNSPECIFIED LATERALITY: ICD-10-CM

## 2022-02-09 DIAGNOSIS — R42 DIZZINESS: Primary | ICD-10-CM

## 2022-02-09 DIAGNOSIS — Z11.52 ENCOUNTER FOR SCREENING LABORATORY TESTING FOR SEVERE ACUTE RESPIRATORY SYNDROME CORONAVIRUS 2 (SARS-COV-2): ICD-10-CM

## 2022-02-09 LAB
ALBUMIN SERPL-MCNC: 3.7 G/DL (ref 3.4–5)
ALP SERPL-CCNC: 75 U/L (ref 40–150)
ALT SERPL W P-5'-P-CCNC: 20 U/L (ref 0–70)
ANION GAP SERPL CALCULATED.3IONS-SCNC: 5 MMOL/L (ref 3–14)
AST SERPL W P-5'-P-CCNC: 14 U/L (ref 0–45)
ATRIAL RATE - MUSE: 54 BPM
BASOPHILS # BLD AUTO: 0 10E3/UL (ref 0–0.2)
BASOPHILS NFR BLD AUTO: 1 %
BILIRUB SERPL-MCNC: 0.9 MG/DL (ref 0.2–1.3)
BUN SERPL-MCNC: 15 MG/DL (ref 7–30)
CALCIUM SERPL-MCNC: 8.8 MG/DL (ref 8.5–10.1)
CHLORIDE BLD-SCNC: 107 MMOL/L (ref 94–109)
CO2 SERPL-SCNC: 24 MMOL/L (ref 20–32)
CREAT SERPL-MCNC: 1.1 MG/DL (ref 0.66–1.25)
DIASTOLIC BLOOD PRESSURE - MUSE: NORMAL MMHG
EOSINOPHIL # BLD AUTO: 0 10E3/UL (ref 0–0.7)
EOSINOPHIL NFR BLD AUTO: 0 %
ERYTHROCYTE [DISTWIDTH] IN BLOOD BY AUTOMATED COUNT: 12.5 % (ref 10–15)
FLUAV RNA SPEC QL NAA+PROBE: NEGATIVE
FLUBV RNA RESP QL NAA+PROBE: NEGATIVE
GFR SERPL CREATININE-BSD FRML MDRD: 83 ML/MIN/1.73M2
GLUCOSE BLD-MCNC: 118 MG/DL (ref 70–99)
HCT VFR BLD AUTO: 43.9 % (ref 40–53)
HGB BLD-MCNC: 15.5 G/DL (ref 13.3–17.7)
HOLD SPECIMEN: NORMAL
HOLD SPECIMEN: NORMAL
IMM GRANULOCYTES # BLD: 0 10E3/UL
IMM GRANULOCYTES NFR BLD: 0 %
INTERPRETATION ECG - MUSE: NORMAL
LYMPHOCYTES # BLD AUTO: 1.1 10E3/UL (ref 0.8–5.3)
LYMPHOCYTES NFR BLD AUTO: 22 %
MCH RBC QN AUTO: 30.6 PG (ref 26.5–33)
MCHC RBC AUTO-ENTMCNC: 35.3 G/DL (ref 31.5–36.5)
MCV RBC AUTO: 87 FL (ref 78–100)
MONOCYTES # BLD AUTO: 0.4 10E3/UL (ref 0–1.3)
MONOCYTES NFR BLD AUTO: 9 %
NEUTROPHILS # BLD AUTO: 3.4 10E3/UL (ref 1.6–8.3)
NEUTROPHILS NFR BLD AUTO: 68 %
NRBC # BLD AUTO: 0 10E3/UL
NRBC BLD AUTO-RTO: 0 /100
P AXIS - MUSE: 67 DEGREES
PLATELET # BLD AUTO: 194 10E3/UL (ref 150–450)
POTASSIUM BLD-SCNC: 3.6 MMOL/L (ref 3.4–5.3)
PR INTERVAL - MUSE: 176 MS
PROT SERPL-MCNC: 6.9 G/DL (ref 6.8–8.8)
QRS DURATION - MUSE: 104 MS
QT - MUSE: 438 MS
QTC - MUSE: 415 MS
R AXIS - MUSE: 59 DEGREES
RBC # BLD AUTO: 5.06 10E6/UL (ref 4.4–5.9)
RSV RNA SPEC NAA+PROBE: NEGATIVE
SARS-COV-2 RNA RESP QL NAA+PROBE: NEGATIVE
SODIUM SERPL-SCNC: 136 MMOL/L (ref 133–144)
SYSTOLIC BLOOD PRESSURE - MUSE: NORMAL MMHG
T AXIS - MUSE: 46 DEGREES
TROPONIN I SERPL HS-MCNC: 6 NG/L
VENTRICULAR RATE- MUSE: 54 BPM
WBC # BLD AUTO: 4.9 10E3/UL (ref 4–11)

## 2022-02-09 PROCEDURE — 96374 THER/PROPH/DIAG INJ IV PUSH: CPT

## 2022-02-09 PROCEDURE — 96361 HYDRATE IV INFUSION ADD-ON: CPT

## 2022-02-09 PROCEDURE — C9803 HOPD COVID-19 SPEC COLLECT: HCPCS

## 2022-02-09 PROCEDURE — 250N000011 HC RX IP 250 OP 636: Performed by: STUDENT IN AN ORGANIZED HEALTH CARE EDUCATION/TRAINING PROGRAM

## 2022-02-09 PROCEDURE — 80053 COMPREHEN METABOLIC PANEL: CPT | Performed by: STUDENT IN AN ORGANIZED HEALTH CARE EDUCATION/TRAINING PROGRAM

## 2022-02-09 PROCEDURE — 93010 ELECTROCARDIOGRAM REPORT: CPT | Performed by: STUDENT IN AN ORGANIZED HEALTH CARE EDUCATION/TRAINING PROGRAM

## 2022-02-09 PROCEDURE — 99284 EMERGENCY DEPT VISIT MOD MDM: CPT | Mod: 25

## 2022-02-09 PROCEDURE — 84484 ASSAY OF TROPONIN QUANT: CPT | Performed by: STUDENT IN AN ORGANIZED HEALTH CARE EDUCATION/TRAINING PROGRAM

## 2022-02-09 PROCEDURE — 93005 ELECTROCARDIOGRAM TRACING: CPT

## 2022-02-09 PROCEDURE — 85025 COMPLETE CBC W/AUTO DIFF WBC: CPT | Performed by: STUDENT IN AN ORGANIZED HEALTH CARE EDUCATION/TRAINING PROGRAM

## 2022-02-09 PROCEDURE — 87637 SARSCOV2&INF A&B&RSV AMP PRB: CPT | Performed by: STUDENT IN AN ORGANIZED HEALTH CARE EDUCATION/TRAINING PROGRAM

## 2022-02-09 PROCEDURE — 258N000003 HC RX IP 258 OP 636: Performed by: STUDENT IN AN ORGANIZED HEALTH CARE EDUCATION/TRAINING PROGRAM

## 2022-02-09 PROCEDURE — 250N000013 HC RX MED GY IP 250 OP 250 PS 637: Performed by: STUDENT IN AN ORGANIZED HEALTH CARE EDUCATION/TRAINING PROGRAM

## 2022-02-09 PROCEDURE — 99284 EMERGENCY DEPT VISIT MOD MDM: CPT | Mod: 25 | Performed by: STUDENT IN AN ORGANIZED HEALTH CARE EDUCATION/TRAINING PROGRAM

## 2022-02-09 PROCEDURE — 36415 COLL VENOUS BLD VENIPUNCTURE: CPT | Performed by: STUDENT IN AN ORGANIZED HEALTH CARE EDUCATION/TRAINING PROGRAM

## 2022-02-09 RX ORDER — MECLIZINE HYDROCHLORIDE 25 MG/1
25 TABLET ORAL ONCE
Status: COMPLETED | OUTPATIENT
Start: 2022-02-09 | End: 2022-02-09

## 2022-02-09 RX ORDER — MECLIZINE HCL 12.5 MG 12.5 MG/1
25 TABLET ORAL 4 TIMES DAILY PRN
Qty: 30 TABLET | Refills: 0 | Status: SHIPPED | OUTPATIENT
Start: 2022-02-09 | End: 2022-04-21

## 2022-02-09 RX ORDER — ONDANSETRON 4 MG/1
4 TABLET, ORALLY DISINTEGRATING ORAL EVERY 6 HOURS PRN
Qty: 10 TABLET | Refills: 0 | Status: SHIPPED | OUTPATIENT
Start: 2022-02-09 | End: 2022-02-12

## 2022-02-09 RX ORDER — ONDANSETRON 2 MG/ML
4 INJECTION INTRAMUSCULAR; INTRAVENOUS ONCE
Status: COMPLETED | OUTPATIENT
Start: 2022-02-09 | End: 2022-02-09

## 2022-02-09 RX ADMIN — ONDANSETRON 4 MG: 2 INJECTION INTRAMUSCULAR; INTRAVENOUS at 09:53

## 2022-02-09 RX ADMIN — MECLIZINE HYDROCHLORIDE 25 MG: 25 TABLET ORAL at 10:28

## 2022-02-09 RX ADMIN — SODIUM CHLORIDE 1000 ML: 9 INJECTION, SOLUTION INTRAVENOUS at 09:53

## 2022-02-09 ASSESSMENT — ENCOUNTER SYMPTOMS
DIZZINESS: 1
SHORTNESS OF BREATH: 0
RHINORRHEA: 0
COUGH: 0
FEVER: 0

## 2022-02-09 ASSESSMENT — MIFFLIN-ST. JEOR: SCORE: 1840.14

## 2022-02-09 NOTE — ED PROVIDER NOTES
"    [place] EMERGENCY DEPARTMENT ([facility])  2/09/22  History     Chief Complaint   Patient presents with     Nausea     Dizziness     The history is provided by the patient and medical records.     Sunny Pruitt is a 46 year old male who presents to the Emergency Department for evaluation of dizziness and nausea.  Patient reports he first noticed the symptoms this morning at approximately 4:30 AM with extreme dizziness.  Patient reports that he did take his temperature after he woke up and noted it to be low at 95.3.  Patient reports this particular dizziness is a \"room spinning\" dizziness.  He reports this tends to get worse and with positional changes.  He otherwise denies any runny nose, cough, or other URI symptoms.  No chest pain or shortness of breath.  No abdominal pain.  He denies history of dizziness like this.  He denies daily use of medications other than a multivitamin.  Patient reports intermittent alcohol use, denies current tobacco use.  He reports he was in a sauna last night.    Past Medical History  History reviewed. No pertinent past medical history.  History reviewed. No pertinent surgical history.  meclizine (ANTIVERT) 12.5 MG tablet  IBUPROFEN PO  loratadine (CLARITIN) 10 MG tablet  Specialty Vitamins Products (VITAMINS FOR HAIR) CAPS      No Known Allergies  Family History  Family History   Problem Relation Age of Onset     Skin Cancer Mother      Ulcerative Colitis Mother      Hyperlipidemia Mother      Cerebrovascular Disease Mother         TIA     Cancer Sister         cancer of tailbone     Cancer Sister      Other Cancer Sister         Malignant chordoma     Dementia Maternal Grandmother      Alzheimer Disease Paternal Grandfather      Diabetes Type 2  Paternal Grandfather      Social History   Social History     Tobacco Use     Smoking status: Former Smoker     Packs/day: 0.00     Years: 10.00     Pack years: 0.00     Types: Cigarettes     Start date: 1/1/1991     Quit date: " "2001     Years since quittin.1     Smokeless tobacco: Never Used   Substance Use Topics     Alcohol use: Yes     Comment: rarely     Drug use: No      Past medical history, past surgical history, medications, allergies, family history, and social history were reviewed with the patient. No additional pertinent items.     I have reviewed the Medications, Allergies, Past Medical and Surgical History, and Social History in the Epic system.    Review of Systems   Constitutional: Negative for fever.   HENT: Negative for rhinorrhea.    Respiratory: Negative for cough and shortness of breath.    Cardiovascular: Negative for chest pain.   Neurological: Positive for dizziness.   All other systems reviewed and are negative.      Physical Exam   BP: (!) 110/95  Pulse: 67  Temp: 97.5  F (36.4  C)  Resp: 14  Height: 190.5 cm (6' 3\")  Weight: 88.5 kg (195 lb)  SpO2: 100 %      Physical Exam  Vitals and nursing note reviewed.   Constitutional:       Appearance: He is not toxic-appearing.   HENT:      Head: Normocephalic and atraumatic.      Right Ear: External ear normal.      Left Ear: External ear normal.      Mouth/Throat:      Mouth: Mucous membranes are dry.      Pharynx: Oropharynx is clear.   Eyes:      Extraocular Movements: Extraocular movements intact.      Conjunctiva/sclera: Conjunctivae normal.      Pupils: Pupils are equal, round, and reactive to light.   Neck:      Comments: No meningeal signs  Cardiovascular:      Rate and Rhythm: Normal rate and regular rhythm.   Pulmonary:      Effort: Pulmonary effort is normal. No respiratory distress.      Breath sounds: Normal breath sounds.   Abdominal:      General: There is no distension.      Palpations: Abdomen is soft.      Tenderness: There is no abdominal tenderness.   Musculoskeletal:         General: No deformity or signs of injury.      Cervical back: Neck supple. No rigidity or tenderness.   Skin:     General: Skin is warm.      Findings: No rash. " "  Neurological:      Mental Status: He is alert.      Comments: GCS 15, no facial droop, equal strength and sensation in all extremities.  No pronator drift.  Reports \"dizziness\" worsened with sitting up and turning head side to side, improved with meclizine, zofran fluids.  Gait normal   Psychiatric:         Mood and Affect: Mood normal.         Behavior: Behavior normal.         ED Course     At 9:29 AM the patient was seen and examined by Alejandra Mejia MD in Room ED08.     Procedures           EKG Interpretation:      Interpreted by Alejandra Mejia MD  Time reviewed: 0930  Symptoms at time of EKG: Dizziness   Rhythm: sinus bradycardia  Rate: 50-60  Axis: Normal  ST Segments/ T Waves: No acute ischemic changes  Q Waves: none  Comparison to prior: No old EKG available    Clinical Impression: No STEMI, no clinically significant interval prolongation, no evidence of acute ischemia    The medical record was reviewed and interpreted.  Current labs reviewed and interpreted.  EKG reviewed and interpreted: Sinus bradycardia, no evidence of acute ischemia.  Managed outpatient prescription medications.     No results found for this or any previous visit (from the past 24 hour(s)).  Medications   0.9% sodium chloride BOLUS (0 mLs Intravenous Stopped 2/9/22 1103)   meclizine (ANTIVERT) tablet 25 mg (25 mg Oral Given 2/9/22 1028)   ondansetron (ZOFRAN) injection 4 mg (4 mg Intravenous Given 2/9/22 0953)          Assessments & Plan (with Medical Decision Making)   MDM:    46 year old M who presents for sudden onset dizziness, nausea that is worsened with head movements.  Started this morning.  Clinical history and physical exam most consistent with BPPV, did also consider cardiac event, electrolyte disturbance, CVA or TIA.  Labs and ecg unremarkable, he has a normal neuro exam and markedly improved here with meclizine, zofran and IVF.  HINTS exam negative.  Will send with meclizine and zofran PRN, refer to ENT and education " about the epley maneuver to do at home if symptoms recur.  He and significant other bedside are agreeable.    On re-evaluation he is markedly improved.  I have discussed all test results and the plan of care with the patient, who is agreeable to discharge with outpatient follow up.  Strict return precautions given and all questions answered prior to discharge.    I have reviewed the nursing notes.    I have reviewed the findings, diagnosis, plan and need for follow up with the patient.    Discharge Medication List as of 2/9/2022 11:19 AM      START taking these medications    Details   meclizine (ANTIVERT) 12.5 MG tablet Take 2 tablets (25 mg) by mouth 4 times daily as needed for dizziness, Disp-30 tablet, R-0, E-Prescribe      ondansetron (ZOFRAN ODT) 4 MG ODT tab Take 1 tablet (4 mg) by mouth every 6 hours as needed for nausea or vomiting, Disp-10 tablet, R-0, E-Prescribe             Final diagnoses:   Dizziness   Benign paroxysmal positional vertigo, unspecified laterality       I, Legihton Chaudhary am serving as a trained medical scribe to document services personally performed by Alejandra Mejia MD, based on the provider's statements to me.      IAlejandra MD, was physically present and have reviewed and verified the accuracy of this note documented by Leighton Chaudhary.     Alejandra Mejia MD  2/9/2022   [facility] EMERGENCY DEPARTMENT

## 2022-02-10 ENCOUNTER — PATIENT OUTREACH (OUTPATIENT)
Dept: FAMILY MEDICINE | Facility: CLINIC | Age: 47
End: 2022-02-10
Payer: COMMERCIAL

## 2022-02-10 NOTE — TELEPHONE ENCOUNTER
"  ED for acute condition Discharge Protocol    \"Hi, my name is Hailee Gray RN, a registered nurse, and I am calling from [facility].  I am calling to follow up and see how things are going for you after your recent emergency visit.\"    Tell me how you are doing now that you are home?\" Feeling better but temperature fluctuation continues.  Nausea and vertigo settled.  Temperatures between 95.2^F and 97.1^F-uses oral thermometer.  Has two different oral thermometers at home and both result in the same readings.  Per patient: no clear reason for low body temperature given in ER.  Of note, temperature recorded in ER on 2/9/22 was 97.5 ^F.  Reviewed when to seek re-evaluation in ER, along with home care measures if feeling chilled: multiple layers, blankets, heating pack-ensure heat source is not directly on skin, drink warm liquids.    Awaiting call back for ENT appt.     Has PCP appt on 2/16/22-established care with a different clinic within [facility].  Encouraged patient to ask if there is a cancellation list.  Patient verbalized understanding and in agreement with plan.          Discharge Instructions    \"Let's review your discharge instructions.  What is/are the follow-up recommendations?  Pt. Response: Follow up with ENT and PCP.    \"Has an appointment with your primary care provider been scheduled?\"  Yes. (confirm and remind to bring meds)    Medications    \"Tell me what changed about your medicines when you discharged?\"    Nothing-PRN medications prescribed.    \"What questions do you have about your medications?\"   None        Call Summary    \"What questions or concerns do you have about your recent visit and your follow-up care?\"     none    \"If you have questions or things don't continue to improve, we encourage you contact us through the main clinic number (give number).  Even if the clinic is not open, triage nurses are available 24/7 to help you.     We would like you to know that our clinic has " "extended hours (provide information).  We also have urgent care (provide details on closest location and hours/contact info)\"    \"Thank you for your time and take care!\"                "

## 2022-02-14 ENCOUNTER — TELEPHONE (OUTPATIENT)
Dept: OTOLARYNGOLOGY | Facility: CLINIC | Age: 47
End: 2022-02-14
Payer: COMMERCIAL

## 2022-02-16 ENCOUNTER — APPOINTMENT (OUTPATIENT)
Dept: INTERNAL MEDICINE | Facility: CLINIC | Age: 47
End: 2022-02-16
Payer: COMMERCIAL

## 2022-02-16 ENCOUNTER — OFFICE VISIT (OUTPATIENT)
Dept: OPHTHALMOLOGY | Facility: CLINIC | Age: 47
End: 2022-02-16
Payer: COMMERCIAL

## 2022-02-16 ENCOUNTER — OFFICE VISIT (OUTPATIENT)
Dept: INTERNAL MEDICINE | Facility: CLINIC | Age: 47
End: 2022-02-16
Payer: COMMERCIAL

## 2022-02-16 ENCOUNTER — OFFICE VISIT (OUTPATIENT)
Dept: AUDIOLOGY | Facility: CLINIC | Age: 47
End: 2022-02-16
Payer: COMMERCIAL

## 2022-02-16 ENCOUNTER — ANCILLARY PROCEDURE (OUTPATIENT)
Dept: BONE DENSITY | Facility: CLINIC | Age: 47
End: 2022-02-16
Payer: COMMERCIAL

## 2022-02-16 ENCOUNTER — OFFICE VISIT (OUTPATIENT)
Dept: DERMATOLOGY | Facility: CLINIC | Age: 47
End: 2022-02-16
Payer: COMMERCIAL

## 2022-02-16 VITALS
RESPIRATION RATE: 16 BRPM | DIASTOLIC BLOOD PRESSURE: 80 MMHG | HEART RATE: 77 BPM | SYSTOLIC BLOOD PRESSURE: 124 MMHG | OXYGEN SATURATION: 99 % | BODY MASS INDEX: 23.87 KG/M2 | WEIGHT: 192 LBS | TEMPERATURE: 98.2 F | HEIGHT: 75 IN

## 2022-02-16 DIAGNOSIS — L82.1 SEBORRHEIC KERATOSIS: Primary | ICD-10-CM

## 2022-02-16 DIAGNOSIS — L81.4 SOLAR LENTIGO: ICD-10-CM

## 2022-02-16 DIAGNOSIS — H02.88B MEIBOMIAN GLAND DYSFUNCTION (MGD) OF UPPER AND LOWER LIDS OF BOTH EYES: ICD-10-CM

## 2022-02-16 DIAGNOSIS — H90.3 ASYMMETRICAL SENSORINEURAL HEARING LOSS: ICD-10-CM

## 2022-02-16 DIAGNOSIS — Z00.00 ENCOUNTER FOR PREVENTATIVE ADULT HEALTH CARE EXAMINATION: ICD-10-CM

## 2022-02-16 DIAGNOSIS — D18.01 CHERRY ANGIOMA: ICD-10-CM

## 2022-02-16 DIAGNOSIS — H90.42 SENSORINEURAL HEARING LOSS (SNHL) OF LEFT EAR WITH UNRESTRICTED HEARING OF RIGHT EAR: Primary | ICD-10-CM

## 2022-02-16 DIAGNOSIS — H52.13 MYOPIA OF BOTH EYES: Primary | ICD-10-CM

## 2022-02-16 DIAGNOSIS — R42 VERTIGO: ICD-10-CM

## 2022-02-16 DIAGNOSIS — D22.9 MULTIPLE BENIGN NEVI: ICD-10-CM

## 2022-02-16 DIAGNOSIS — Z00.00 ENCOUNTER FOR PREVENTATIVE ADULT HEALTH CARE EXAMINATION: Primary | ICD-10-CM

## 2022-02-16 DIAGNOSIS — H02.88A MEIBOMIAN GLAND DYSFUNCTION (MGD) OF UPPER AND LOWER LIDS OF BOTH EYES: ICD-10-CM

## 2022-02-16 DIAGNOSIS — E03.8 SUBCLINICAL HYPOTHYROIDISM: ICD-10-CM

## 2022-02-16 LAB
ALBUMIN UR-MCNC: NEGATIVE MG/DL
ALP SERPL-CCNC: 82 U/L (ref 40–150)
ALT SERPL W P-5'-P-CCNC: 23 U/L (ref 0–70)
APPEARANCE UR: CLEAR
BASOPHILS # BLD AUTO: 0.1 10E3/UL (ref 0–0.2)
BASOPHILS NFR BLD AUTO: 1 %
BILIRUB UR QL STRIP: NEGATIVE
CHOLEST SERPL-MCNC: 157 MG/DL
COLOR UR AUTO: NORMAL
CREAT SERPL-MCNC: 1.1 MG/DL (ref 0.66–1.25)
DEPRECATED CALCIDIOL+CALCIFEROL SERPL-MC: 33 UG/L (ref 20–75)
EOSINOPHIL # BLD AUTO: 0.1 10E3/UL (ref 0–0.7)
EOSINOPHIL NFR BLD AUTO: 1 %
ERYTHROCYTE [DISTWIDTH] IN BLOOD BY AUTOMATED COUNT: 12.5 % (ref 10–15)
EXPTIME-PRE: 6.64 SEC
FASTING STATUS PATIENT QL REPORTED: NORMAL
FASTING STATUS PATIENT QL REPORTED: NORMAL
FEF2575-%PRED-PRE: 92 %
FEF2575-PRE: 3.8 L/SEC
FEF2575-PRED: 4.12 L/SEC
FEFMAX-%PRED-PRE: 85 %
FEFMAX-PRE: 9.41 L/SEC
FEFMAX-PRED: 11.02 L/SEC
FEV1-%PRED-PRE: 99 %
FEV1-PRE: 4.58 L
FEV1FEV6-PRE: 77 %
FEV1FEV6-PRED: 81 %
FEV1FVC-PRE: 76 %
FEV1FVC-PRED: 79 %
FIFMAX-PRE: 6.21 L/SEC
FVC-%PRED-PRE: 101 %
FVC-PRE: 6.01 L
FVC-PRED: 5.91 L
GFR SERPL CREATININE-BSD FRML MDRD: 83 ML/MIN/1.73M2
GLUCOSE BLD-MCNC: 98 MG/DL (ref 70–99)
GLUCOSE UR STRIP-MCNC: NEGATIVE MG/DL
HCT VFR BLD AUTO: 46.9 % (ref 40–53)
HDLC SERPL-MCNC: 49 MG/DL
HGB BLD-MCNC: 16.4 G/DL (ref 13.3–17.7)
HGB UR QL STRIP: NEGATIVE
HIV 1+2 AB+HIV1 P24 AG SERPL QL IA: NONREACTIVE
HOLD SPECIMEN: NORMAL
HOLD SPECIMEN: NORMAL
IMM GRANULOCYTES # BLD: 0 10E3/UL
IMM GRANULOCYTES NFR BLD: 0 %
KETONES UR STRIP-MCNC: NEGATIVE MG/DL
LDLC SERPL CALC-MCNC: 93 MG/DL
LEUKOCYTE ESTERASE UR QL STRIP: NEGATIVE
LYMPHOCYTES # BLD AUTO: 1.6 10E3/UL (ref 0.8–5.3)
LYMPHOCYTES NFR BLD AUTO: 30 %
MCH RBC QN AUTO: 30.4 PG (ref 26.5–33)
MCHC RBC AUTO-ENTMCNC: 35 G/DL (ref 31.5–36.5)
MCV RBC AUTO: 87 FL (ref 78–100)
MONOCYTES # BLD AUTO: 0.5 10E3/UL (ref 0–1.3)
MONOCYTES NFR BLD AUTO: 10 %
NEUTROPHILS # BLD AUTO: 3.1 10E3/UL (ref 1.6–8.3)
NEUTROPHILS NFR BLD AUTO: 58 %
NITRATE UR QL: NEGATIVE
NONHDLC SERPL-MCNC: 108 MG/DL
NRBC # BLD AUTO: 0 10E3/UL
NRBC BLD AUTO-RTO: 0 /100
PH UR STRIP: 6 [PH] (ref 5–7)
PLATELET # BLD AUTO: 190 10E3/UL (ref 150–450)
PSA SERPL-MCNC: 0.57 UG/L (ref 0–4)
RBC # BLD AUTO: 5.39 10E6/UL (ref 4.4–5.9)
RBC URINE: 1 /HPF
SP GR UR STRIP: 1 (ref 1–1.03)
TRIGL SERPL-MCNC: 77 MG/DL
TSH SERPL DL<=0.005 MIU/L-ACNC: 4.19 MU/L (ref 0.4–4)
UROBILINOGEN UR STRIP-MCNC: NORMAL MG/DL
WBC # BLD AUTO: 5.3 10E3/UL (ref 4–11)
WBC URINE: <1 /HPF

## 2022-02-16 PROCEDURE — 84443 ASSAY THYROID STIM HORMONE: CPT | Performed by: PATHOLOGY

## 2022-02-16 PROCEDURE — 96999 UNLISTED SPEC DERM SVC/PX: CPT | Performed by: INTERNAL MEDICINE

## 2022-02-16 PROCEDURE — 92004 COMPRE OPH EXAM NEW PT 1/>: CPT | Performed by: OPTOMETRIST

## 2022-02-16 PROCEDURE — 85025 COMPLETE CBC W/AUTO DIFF WBC: CPT | Performed by: PATHOLOGY

## 2022-02-16 PROCEDURE — 93010 ELECTROCARDIOGRAM REPORT: CPT | Performed by: INTERNAL MEDICINE

## 2022-02-16 PROCEDURE — 84460 ALANINE AMINO (ALT) (SGPT): CPT | Performed by: PATHOLOGY

## 2022-02-16 PROCEDURE — G0103 PSA SCREENING: HCPCS | Performed by: PATHOLOGY

## 2022-02-16 PROCEDURE — 82565 ASSAY OF CREATININE: CPT | Performed by: PATHOLOGY

## 2022-02-16 PROCEDURE — 99207 PR NO CHARGE LOS: CPT

## 2022-02-16 PROCEDURE — 77080 DXA BONE DENSITY AXIAL: CPT | Performed by: INTERNAL MEDICINE

## 2022-02-16 PROCEDURE — 80061 LIPID PANEL: CPT | Performed by: PATHOLOGY

## 2022-02-16 PROCEDURE — 82306 VITAMIN D 25 HYDROXY: CPT | Performed by: INTERNAL MEDICINE

## 2022-02-16 PROCEDURE — 99203 OFFICE O/P NEW LOW 30 MIN: CPT | Performed by: DERMATOLOGY

## 2022-02-16 PROCEDURE — 86803 HEPATITIS C AB TEST: CPT | Performed by: INTERNAL MEDICINE

## 2022-02-16 PROCEDURE — 36415 COLL VENOUS BLD VENIPUNCTURE: CPT | Performed by: PATHOLOGY

## 2022-02-16 PROCEDURE — 92557 COMPREHENSIVE HEARING TEST: CPT | Performed by: AUDIOLOGIST

## 2022-02-16 PROCEDURE — 99386 PREV VISIT NEW AGE 40-64: CPT | Mod: 25 | Performed by: INTERNAL MEDICINE

## 2022-02-16 PROCEDURE — 81001 URINALYSIS AUTO W/SCOPE: CPT | Performed by: PATHOLOGY

## 2022-02-16 PROCEDURE — 94375 RESPIRATORY FLOW VOLUME LOOP: CPT | Performed by: INTERNAL MEDICINE

## 2022-02-16 PROCEDURE — 84075 ASSAY ALKALINE PHOSPHATASE: CPT | Performed by: PATHOLOGY

## 2022-02-16 PROCEDURE — 82947 ASSAY GLUCOSE BLOOD QUANT: CPT | Performed by: PATHOLOGY

## 2022-02-16 PROCEDURE — 87389 HIV-1 AG W/HIV-1&-2 AB AG IA: CPT | Performed by: INTERNAL MEDICINE

## 2022-02-16 PROCEDURE — 92567 TYMPANOMETRY: CPT | Performed by: AUDIOLOGIST

## 2022-02-16 PROCEDURE — 92015 DETERMINE REFRACTIVE STATE: CPT | Performed by: OPTOMETRIST

## 2022-02-16 PROCEDURE — 90714 TD VACC NO PRESV 7 YRS+ IM: CPT | Performed by: INTERNAL MEDICINE

## 2022-02-16 PROCEDURE — 90471 IMMUNIZATION ADMIN: CPT | Performed by: INTERNAL MEDICINE

## 2022-02-16 ASSESSMENT — SLIT LAMP EXAM - LIDS
COMMENTS: 1+ MGD
COMMENTS: 1+ MGD

## 2022-02-16 ASSESSMENT — PAIN SCALES - GENERAL
PAINLEVEL: NO PAIN (0)
PAINLEVEL: NO PAIN (1)

## 2022-02-16 ASSESSMENT — REFRACTION_MANIFEST
OD_ADD: +1.75
OD_SPHERE: -1.00
OS_CYLINDER: SPHERE
OD_AXIS: 133
OD_CYLINDER: +0.75
OS_SPHERE: -1.00
OS_ADD: +1.75

## 2022-02-16 ASSESSMENT — VISUAL ACUITY
OD_CC+: -2
CORRECTION_TYPE: GLASSES
OS_CC: 20/15
OD_CC: 20/20
METHOD: SNELLEN - LINEAR

## 2022-02-16 ASSESSMENT — CONF VISUAL FIELD
OD_NORMAL: 1
OS_NORMAL: 1
METHOD: COUNTING FINGERS

## 2022-02-16 ASSESSMENT — EXTERNAL EXAM - RIGHT EYE: OD_EXAM: NORMAL

## 2022-02-16 ASSESSMENT — REFRACTION_WEARINGRX
OD_SPHERE: -0.50
OS_ADD: +1.75
SPECS_TYPE: PAL
OD_CYLINDER: +0.50
OS_CYLINDER: +0.25
OS_AXIS: 056
OS_SPHERE: -0.75
OD_ADD: +1.75
OD_AXIS: 113

## 2022-02-16 ASSESSMENT — EXTERNAL EXAM - LEFT EYE: OS_EXAM: NORMAL

## 2022-02-16 ASSESSMENT — TONOMETRY
OD_IOP_MMHG: 12
OS_IOP_MMHG: 11
IOP_METHOD: ICARE

## 2022-02-16 ASSESSMENT — CUP TO DISC RATIO
OD_RATIO: 0.4
OS_RATIO: 0.3

## 2022-02-16 NOTE — NURSING NOTE
Chief Complaint   Patient presents with     Physical     Patient is here for annual physical     Jodie Holley CMA 9:11 AM on 2/16/2022.

## 2022-02-16 NOTE — LETTER
2/16/2022       RE: Sunny Pruitt  [address] [place] [address]     Dear Colleague,    Thank you for referring your patient, Sunny Pruitt, to the [facility] DERMATOLOGY [facility] [place] [facility]. Please see a copy of my visit note below.    [facility] Dermatology Note  Encounter Date: Feb 16, 2022  Office Visit     Dermatology Problem List:  1. Last FBSE 2/16/2022   ____________________________________________    Assessment & Plan:    # Hx biopsy of suspected benign nevus, left upper arm. Pigment recurrence noted today, but defined to scar borders. Will request records and monitor clinically. No further treatment needed at this point.   - Photodocumentation  - Records requested from [facility]    # Benign lesions: Multiple benign nevi, solar lentigos, seborrheic keratoses, cherry angiomas. Explained to patient benign nature of lesion. No treatment is necessary at this time unless the lesion changes or becomes symptomatic.   - ABCDs of melanoma were discussed and self skin checks were advised.  - Sun precaution was advised including the use of sun screens of SPF 30 or higher, sun protective clothing, and avoidance of tanning beds.    Procedures Performed:   None    Follow-up: 1 year(s) in-person, or earlier for new or changing lesions    Staff and Scribe:     Scribe Disclosure:  I, Brennan Bronson, am serving as a scribe to document services personally performed by Oli Sandy MD based on data collection and the provider's statements to me.     Provider Disclosure:   The documentation recorded by the scribe accurately reflects the services I personally performed and the decisions made by me.    Oli Sandy MD    Department of Dermatology  [facility]: Phone: [phone], Fax:[phone]  [facility]: Phone: [phone] Fax: [phone]  ____________________________________________    CC: Skin Check (pt states he is here for full body skin check, mole on left shoulder, left back of arm and chest. )    HPI:  Mr. Sunny Pruitt is a(n) 46 year old male who presents today as a new patient for FBSE. Patient reports spots on the left shoulder, left posterior arm, and chest that he would like examined. Additionally, patient reports that he had the spot on his left upper arm biopsied at [facility] that he believes was benign. Reports family history of NMSC (mother). Denies history of tanning bed use. Patient uses SPF regularly and tans rather than burns with sun exposure.    Patient is otherwise feeling well, without additional skin concerns.    Labs Reviewed:  N/A    Physical Exam:  Vitals: There were no vitals taken for this visit.  SKIN: Full skin, which includes the head/face, both arms, chest, back, abdomen,both legs, genitalia and/or groin buttocks, digits and/or nails, was examined.  - Well healed scar on the left upper arm at prior shave biopsy site with central repigmentation confined to the scar borders.  - There are dome shaped bright red papules on the trunk and extremities.   - Multiple regular brown pigmented macules and papules are identified on the trunk and extremities.   - Scattered brown macules on sun exposed areas.  - There are waxy stuck on tan to brown papules on the trunk and extremities.   - No other lesions of concern on areas examined.             Medications:  Current Outpatient Medications   Medication     IBUPROFEN PO     loratadine (CLARITIN) 10 MG tablet     Specialty Vitamins Products (VITAMINS FOR HAIR) CAPS     meclizine (ANTIVERT) 12.5 MG tablet     No current facility-administered medications for this visit.      Past Medical History:   Patient Active Problem List   Diagnosis     CARDIOVASCULAR SCREENING; LDL GOAL LESS THAN 160     No past medical history  on file.

## 2022-02-16 NOTE — LETTER
2/16/2022     RE: uSnny Pruitt  [address] [place] [address]     Dear Colleague,    Thank you for referring your patient, Sunny Pruitt, to the [facility]. Please see a copy of my visit note below.     History and Physical Examination     SUBJECTIVE: Chief concern: preventive health review.     Past Medical History:  1.  Congenital pes cavus, status post right (2015 and left (2016) osteotomy procedures  2.  Status post right foot surgery to address calcaneal cuboid joint nonunion, 2016  3.  Right navicular cuneiform arthritis  4.  Status post left foot peroneal subluxation open reduction/internal fixation, 2019  5.  Status post right (2015) and left (2018) arthroscopic partial meniscectomies.  6.  Allergic rhinitis  7.  Status post hydrocelectomy, 12/2011  8.  History of maxillary sinusitis, 12/2021    Adverse Drug Reactions: None.     Current Medications:  Daily multivitamin supplement  Ibuprofen, 200 mg as needed  Loratadine, 10 mg daily as needed, primarily during spring months.  Calcium plus vitamin D3 supplement, one tablet daily     Habits:  Tobacco: None since 2010; history of 5 pack-years (10 years ×1/2 pack per day) of previous use  Alcohol: 1 serving, 6 days per week  Caffeine: 3 servings of coffee per day     Social History:  to Ed Reis, an [place] native executive at [employer].  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  Ed accepted a position with [employer], which led to an overseas assignment in [facility], at which point Sunny chose to leave academic to support Ed's career.  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  He enjoys outdoor activities, including cycling, cross-country skiing, and walking their poodle mix dog.  Typically, he exercises for 40-45 minutes per day, rotating workouts among free weights, stationary biking, and machine rowing, followed by stretching sessions or yoga.    Family History: Father is in good health at age 89.  Mother is 89, with history of dyslipidemia, skin cancer, and TIA.  A sister 1 year his senior is in good health, with history of malignant chordoma of the coccyx for which she underwent surgery in approximately 2015.  A biological daughter for whose mother he was a sperm donor has scoliosis and unspecified arthritis at age 19.  Maternal grandmother  at age 87, with history of TIAs and dementia at advanced age.  Maternal grandfather  at age 74; details of health history not known.  Paternal grandmother  at advanced age.  Paternal grandfather  at age 84, with history of type 2 diabetes mellitus and dementia, diagnosed at age 82.     Review of Systems: Recent (2022) episode of vertigo, evaluated emergently and deemed most consistent with BPPV, based on examination and response to meclizine, ondansetron, and intravenous fluids.  Symptoms improved \"by 90%\" by the following day and are now \"95% better\" than at peak.  He notes a minimal transient \"swimming\" sensation when he bends at the waist from a standing position, such as when tying shoes; transient symptoms recur when he returns to an upright position.  He notes minimal nausea and and intermittent 1/10 headache, symptoms he would attributed to a URI if he had sinus congestion, rhinorrhea or other suggestive symptoms.  The evening before the onset of symptoms.  He had spent 30 minutes in a sauna, then walked 10 minutes to reach home on a very cold evening; he had been instructed to fast overnight for planned laboratory tests.  His symptoms " were associated with a low home temperature reading; during the ER evaluation, his temperature was closer to normal.  He is not aware of hearing loss with the event and did not experience vomiting, severe headache, change in vision, or focal weakness or sensory loss.  Following the multiple surgical procedures on his feet, he has noted marked improvement of previous problems with pain and stiffness; generally, his discomfort is 0/10 on the left and 2-3/10 on the right with avoidance of high-impact activities.  He describes mild tinnitus, which has progressed over the past 2 years.  No history of colonoscopy.  IPV was administered in 1/2012.  Most recent tetanus (Td AP).  This was administered in 1/2012.  Hepatitis A/hepatitis B vaccination series was completed in 11/2018.  Typhoid IM was administered in 1/2012.  Covid (Pfizer) vaccinations were administered on 4/3, or/24, and 12/11/2021.  Remainder of complete review of systems was negative.    OBJECTIVE:     Vital signs: Height 74.75  Inches.  Weight 192 pounds.  Blood pressure 119/77 on average of 3 automated readings.  Heart rate 58, respiratory rate 16.  Temperature 98.2 degrees.  O2 saturation 99% on room air.  General: Alert, neatly dressed and groomed, in no acute distress.  HEENT: Atraumatic and normocephalic. Eyelids, pupils, and conjunctivae appeared normal. Lips, teeth and gums appear normal.  Oropharynx showed moist mucous membranes, without exudate or erythema.  Neck: Supple, without thyromegaly, mass, or bruit. No cervical or supraclavicular lymphadenopathy.  Back: No spinal or costovertebral angle tenderness.  Chest: Clear to auscultation and percussion. Normal respiratory effort.  Cardiovascular: No jugular venous distention. Regular rate and rhythm, normal S1, S2 without murmur.  Abdomen: Bowel sounds positive; soft, nontender, without rebound, guarding, hepatosplenomegaly or mass.  Extremities: No cyanosis or edema.  Genitalia: Normal male  "genitalia, without scrotal mass or hernia. No inguinal lymphadenopathy.  Rectal: Normal tone, with smooth, nontender, nonenlarged prostate. No rectal mass.  Skin: Examination was deferred; full evaluation was completed earlier in day through dermatology clinic.  Neurologic: Cranial nerves II-XII were grossly intact. Sensory and motor examinations were normal. Normal gait.  Mini-cog score was 5/5.  Barany maneuvers in each of 3 planes did not elicit obvious nystagmus, although minimal transient \"swimming\" sensation was noted by the patient with rotation of the head to left and after sitting upright following rotation of the head to the right.  Psychiatric: Alert and oriented ×3. Normal affect. Judgment and insight intact.  MICHAEL-7 and PHQ-2 scores were 0.    Creatinine 1.10, alkaline phosphatase 82, ALT 23, cholesterol 157, HDL 49, LDL 93, triglycerides 77, cholesterol/HDL 3.2, PSA 0.57, TSH 4.19, 25-hydroxy vitamin D 33, glucose 98, white blood cell count 5300, hemoglobin 16.4, platelets 190,000, HIV nonreactive, hepatitis C screen pending, urinalysis unremarkable.    EKG was unremarkable.  Spirometry showed an FEV1 of 4.58, with an FVC of 6.01; readings were 99% and 101% of predicted values, respectively.    DEXA showed normal bone density, with most negative and valid Z-score of -1.4 at the level of the right total hip.  Body composition analysis showed 28.5% fat (30th percentile).     ASSESSMENT:    1.  Vertigo.  Presumably BPPV based on ER evaluation and prompt improvement.  Minimal residual symptoms with ENT consultation planned in the near future.  With virtual resolution of symptoms, he will forego vestibular therapy.  He agrees to arrange emergent evaluation in the event of severe headache, vomiting, or pronounced progression of symptoms.    2.  Asymmetric hearing loss.  Relatively minimal asymmetry; at the recommendation of audiology, ENT consultation as requested by ER staff on 2/9/2022 will be pursued, with " discussion of problem #1 at that time.    3.  Subclinical hypothyroidism.  No problems with cold intolerance, fatigue, unexplained weight gain, or constipation.  We discussed the implications of this diagnosis and the importance of repeating a TSH level within 12 months.    4.  Preventive care.  Urology consultation to discuss vasectomy will be arranged; his wife would like to discontinue use of oral contraceptives.  Colonoscopy was recommended.  Tetanus (Td) booster was provided at the time of his appointment, bringing his immunization status up-to-date.  Estimated 10-year risk of a vascular event is 1.8%.  Based on AHA/ACC guidelines (optimal for his cohort is 1.7%).  We reviewed the elements of a healthful diet and the importance of continuing a regimen of regular exercise.  He was congratulated for his low body fat percentage.  I recommended daily use of a 50 mcg vitamin D3 supplement, while maintaining daily calcium intake of 1200 mg, preferably from dietary sources.  We reviewed the debate regarding the benefit of multivitamin supplements and the importance of selecting a product that does not contain iron should he choose to proceed.    PLAN: See above.     ~SRT    Again, thank you for allowing me to participate in the care of your patient.      Sincerely,    Derrick Aburto MD

## 2022-02-16 NOTE — NURSING NOTE
AHA BP    1st   118/77  2nd  115/76  3rd   124/80    Average   119/77  Jodie Holley CMA 9:38 AM on 2/16/2022

## 2022-02-16 NOTE — Clinical Note
Hi Dr. Aburto,     I believe this patient would benefit from a vestibular PT referral for dizziness/possible BPPV. He reported going to ER last week and they thought it was likely BPPV. He feel somewhat better but still having mild symptoms, worsened by bending over. I am also recommending an ENT referral for asymmetry found on hearing test. If his dizziness doesn't improve with PT, ENT can address that as well. If you agree, please place referrals.     Thanks,  H[place]a

## 2022-02-16 NOTE — PROGRESS NOTES
[facility] Dermatology Note  Encounter Date: Feb 16, 2022  Office Visit     Dermatology Problem List:  1. Last FBSE 2/16/2022   ____________________________________________    Assessment & Plan:    # Hx biopsy of suspected benign nevus, left upper arm. Pigment recurrence noted today, but defined to scar borders. Will request records and monitor clinically. No further treatment needed at this point.   - Photodocumentation  - Records requested from [facility]    # Benign lesions: Multiple benign nevi, solar lentigos, seborrheic keratoses, cherry angiomas. Explained to patient benign nature of lesion. No treatment is necessary at this time unless the lesion changes or becomes symptomatic.   - ABCDs of melanoma were discussed and self skin checks were advised.  - Sun precaution was advised including the use of sun screens of SPF 30 or higher, sun protective clothing, and avoidance of tanning beds.    Procedures Performed:   None    Follow-up: 1 year(s) in-person, or earlier for new or changing lesions    Staff and Scribe:     Scribe Disclosure:  I, Brennan Bronson, am serving as a scribe to document services personally performed by Oli Sandy MD based on data collection and the provider's statements to me.     Provider Disclosure:   The documentation recorded by the scribe accurately reflects the services I personally performed and the decisions made by me.    Oli Sandy MD    Department of Dermatology  [facility]: Phone: [phone], Fax:[phone]  [facility]: Phone: [phone] Fax: [phone]  ____________________________________________    CC: Skin Check (pt states he is here for full body skin check, mole on left shoulder, left back of arm and chest. )    HPI:  Mr. Sunny Pruitt is a(n) 46 year old male who presents today as a new patient for  FBSE. Patient reports spots on the left shoulder, left posterior arm, and chest that he would like examined. Additionally, patient reports that he had the spot on his left upper arm biopsied at [facility] that he believes was benign. Reports family history of NMSC (mother). Denies history of tanning bed use. Patient uses SPF regularly and tans rather than burns with sun exposure.    Patient is otherwise feeling well, without additional skin concerns.    Labs Reviewed:  N/A    Physical Exam:  Vitals: There were no vitals taken for this visit.  SKIN: Full skin, which includes the head/face, both arms, chest, back, abdomen,both legs, genitalia and/or groin buttocks, digits and/or nails, was examined.  - Well healed scar on the left upper arm at prior shave biopsy site with central repigmentation confined to the scar borders.  - There are dome shaped bright red papules on the trunk and extremities.   - Multiple regular brown pigmented macules and papules are identified on the trunk and extremities.   - Scattered brown macules on sun exposed areas.  - There are waxy stuck on tan to brown papules on the trunk and extremities.   - No other lesions of concern on areas examined.             Medications:  Current Outpatient Medications   Medication     IBUPROFEN PO     loratadine (CLARITIN) 10 MG tablet     Specialty Vitamins Products (VITAMINS FOR HAIR) CAPS     meclizine (ANTIVERT) 12.5 MG tablet     No current facility-administered medications for this visit.      Past Medical History:   Patient Active Problem List   Diagnosis     CARDIOVASCULAR SCREENING; LDL GOAL LESS THAN 160     No past medical history on file.

## 2022-02-16 NOTE — LETTER
2/16/2022      RE: Sunny Pruitt  [address] [place] [address]        History and Physical Examination     SUBJECTIVE: Chief concern: preventive health review.     Past Medical History:  1.  Congenital pes cavus, status post right (2015 and left (2016) osteotomy procedures  2.  Status post right foot surgery to address calcaneal cuboid joint nonunion, 2016  3.  Right navicular cuneiform arthritis  4.  Status post left foot peroneal subluxation open reduction/internal fixation, 2019  5.  Status post right (2015) and left (2018) arthroscopic partial meniscectomies.  6.  Allergic rhinitis  7.  Status post hydrocelectomy, 12/2011  8.  History of maxillary sinusitis, 12/2021    Adverse Drug Reactions: None.     Current Medications:  Daily multivitamin supplement  Ibuprofen, 200 mg as needed  Loratadine, 10 mg daily as needed, primarily during spring months.  Calcium plus vitamin D3 supplement, one tablet daily     Habits:  Tobacco: None since 2010; history of 5 pack-years (10 years ×1/2 pack per day) of previous use  Alcohol: 1 serving, 6 days per week  Caffeine: 3 servings of coffee per day     Social History:  to Ed Reis, an [place] native executive at [employer].  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  Ed accepted a position with [employer], which led to an overseas assignment in [facility], at which point Sunny chose to leave academic to support Ed's career.  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  He enjoys outdoor activities, including cycling, cross-country skiing, and walking their poodle mix dog.  Typically, he exercises for 40-45 minutes per  "day, rotating workouts among free weights, stationary biking, and machine rowing, followed by stretching sessions or yoga.    Family History: Father is in good health at age 89.  Mother is 89, with history of dyslipidemia, skin cancer, and TIA.  A sister 1 year his senior is in good health, with history of malignant chordoma of the coccyx for which she underwent surgery in approximately 2015.  A biological daughter for whose mother he was a sperm donor has scoliosis and unspecified arthritis at age 19.  Maternal grandmother  at age 87, with history of TIAs and dementia at advanced age.  Maternal grandfather  at age 74; details of health history not known.  Paternal grandmother  at advanced age.  Paternal grandfather  at age 84, with history of type 2 diabetes mellitus and dementia, diagnosed at age 82.     Review of Systems: Recent (2022) episode of vertigo, evaluated emergently and deemed most consistent with BPPV, based on examination and response to meclizine, ondansetron, and intravenous fluids.  Symptoms improved \"by 90%\" by the following day and are now \"95% better\" than at peak.  He notes a minimal transient \"swimming\" sensation when he bends at the waist from a standing position, such as when tying shoes; transient symptoms recur when he returns to an upright position.  He notes minimal nausea and and intermittent 1/10 headache, symptoms he would attributed to a URI if he had sinus congestion, rhinorrhea or other suggestive symptoms.  The evening before the onset of symptoms.  He had spent 30 minutes in a sauna, then walked 10 minutes to reach home on a very cold evening; he had been instructed to fast overnight for planned laboratory tests.  His symptoms were associated with a low home temperature reading; during the ER evaluation, his temperature was closer to normal.  He is not aware of hearing loss with the event and did not experience vomiting, severe headache, change in vision, or " focal weakness or sensory loss.  Following the multiple surgical procedures on his feet, he has noted marked improvement of previous problems with pain and stiffness; generally, his discomfort is 0/10 on the left and 2-3/10 on the right with avoidance of high-impact activities.  He describes mild tinnitus, which has progressed over the past 2 years.  No history of colonoscopy.  IPV was administered in 1/2012.  Most recent tetanus (Td AP).  This was administered in 1/2012.  Hepatitis A/hepatitis B vaccination series was completed in 11/2018.  Typhoid IM was administered in 1/2012.  Covid (Pfizer) vaccinations were administered on 4/3, or/24, and 12/11/2021.  Remainder of complete review of systems was negative.    OBJECTIVE:     Vital signs: Height 74.75  Inches.  Weight 192 pounds.  Blood pressure 119/77 on average of 3 automated readings.  Heart rate 58, respiratory rate 16.  Temperature 98.2 degrees.  O2 saturation 99% on room air.  General: Alert, neatly dressed and groomed, in no acute distress.  HEENT: Atraumatic and normocephalic. Eyelids, pupils, and conjunctivae appeared normal. Lips, teeth and gums appear normal.  Oropharynx showed moist mucous membranes, without exudate or erythema.  Neck: Supple, without thyromegaly, mass, or bruit. No cervical or supraclavicular lymphadenopathy.  Back: No spinal or costovertebral angle tenderness.  Chest: Clear to auscultation and percussion. Normal respiratory effort.  Cardiovascular: No jugular venous distention. Regular rate and rhythm, normal S1, S2 without murmur.  Abdomen: Bowel sounds positive; soft, nontender, without rebound, guarding, hepatosplenomegaly or mass.  Extremities: No cyanosis or edema.  Genitalia: Normal male genitalia, without scrotal mass or hernia. No inguinal lymphadenopathy.  Rectal: Normal tone, with smooth, nontender, nonenlarged prostate. No rectal mass.  Skin: Examination was deferred; full evaluation was completed earlier in day through  "dermatology clinic.  Neurologic: Cranial nerves II-XII were grossly intact. Sensory and motor examinations were normal. Normal gait.  Mini-cog score was 5/5.  Barany maneuvers in each of 3 planes did not elicit obvious nystagmus, although minimal transient \"swimming\" sensation was noted by the patient with rotation of the head to left and after sitting upright following rotation of the head to the right.  Psychiatric: Alert and oriented ×3. Normal affect. Judgment and insight intact.  MICHAEL-7 and PHQ-2 scores were 0.    Creatinine 1.10, alkaline phosphatase 82, ALT 23, cholesterol 157, HDL 49, LDL 93, triglycerides 77, cholesterol/HDL 3.2, PSA 0.57, TSH 4.19, 25-hydroxy vitamin D 33, glucose 98, white blood cell count 5300, hemoglobin 16.4, platelets 190,000, HIV nonreactive, hepatitis C screen pending, urinalysis unremarkable.    EKG was unremarkable.  Spirometry showed an FEV1 of 4.58, with an FVC of 6.01; readings were 99% and 101% of predicted values, respectively.    DEXA showed normal bone density, with most negative and valid Z-score of -1.4 at the level of the right total hip.  Body composition analysis showed 28.5% fat (30th percentile).     ASSESSMENT:    1.  Vertigo.  Presumably BPPV based on ER evaluation and prompt improvement.  Minimal residual symptoms with ENT consultation planned in the near future.  With virtual resolution of symptoms, he will forego vestibular therapy.  He agrees to arrange emergent evaluation in the event of severe headache, vomiting, or pronounced progression of symptoms.    2.  Asymmetric hearing loss.  Relatively minimal asymmetry; at the recommendation of audiology, ENT consultation as requested by ER staff on 2/9/2022 will be pursued, with discussion of problem #1 at that time.    3.  Subclinical hypothyroidism.  No problems with cold intolerance, fatigue, unexplained weight gain, or constipation.  We discussed the implications of this diagnosis and the importance of repeating " a TSH level within 12 months.    4.  Preventive care.  Urology consultation to discuss vasectomy will be arranged; his wife would like to discontinue use of oral contraceptives.  Colonoscopy was recommended.  Tetanus (Td) booster was provided at the time of his appointment, bringing his immunization status up-to-date.  Estimated 10-year risk of a vascular event is 1.8%.  Based on AHA/ACC guidelines (optimal for his cohort is 1.7%).  We reviewed the elements of a healthful diet and the importance of continuing a regimen of regular exercise.  He was congratulated for his low body fat percentage.  I recommended daily use of a 50 mcg vitamin D3 supplement, while maintaining daily calcium intake of 1200 mg, preferably from dietary sources.  We reviewed the debate regarding the benefit of multivitamin supplements and the importance of selecting a product that does not contain iron should he choose to proceed.    PLAN: See above.     ~SRT        Derrick Aburto MD

## 2022-02-16 NOTE — PROGRESS NOTES
AUDIOLOGY REPORT    [facility] Visit    SUMMARY: Audiology visit completed. See audiogram for results.      RECOMMENDATIONS: Follow up with Dr. Aburto. Consider referral to vestibular physical therapy to recheck and treat possible BPPV. Consider ENT referral for asymmetry.    Sabina Posey.  Licensed Audiologist  [place] # [id number]

## 2022-02-16 NOTE — PROGRESS NOTES
History  HPI     COMPREHENSIVE EYE EXAM     In both eyes.  Associated symptoms include Negative for dryness, eye pain, tearing, flashes and floaters.  Treatments tried include no treatments.  Pain was noted as 0/10.              Comments     Pt is seeing well cc, he usually has eye exam q2 years, had last one almost 2 years ago, no concerns about [facility] today.     POHx: denies injuries, sx, or lasers, no dx in past  Fhx: denies AMD, RD, or glaucoma            Last edited by Josselyn Cifuentes on 2/16/2022 12:05 PM. (History)          Assessment/Plan  (H52.13) Myopia of both eyes  (primary encounter diagnosis)  Comment: Myopia both eyes with presbyopia  Plan: REFRACTION         Educated patient on condition and clinical findings. Dispensed spectacle prescription for full time wear. Monitor annually.    (H02.88A,  H02.88B) Meibomian gland dysfunction (MGD) of upper and lower lids of both eyes  Comment: Asymptomatic  Plan:  Recommended warm compresses as needed for comfort. Monitor annually.    Return to clinic in 1 year for comprehensive eye exam.    Complete documentation of historical and exam elements from today's encounter can  be found in the full encounter summary report (not reduplicated in this progress  note). I personally obtained the chief complaint(s) and history of present illness. I  confirmed and edited as necessary the review of systems, past medical/surgical  history, family history, social history, and examination findings as documented by  others; and I examined the patient myself. I personally reviewed the relevant tests,  images, and reports as documented above. I formulated and edited as necessary the  assessment and plan and discussed the findings and management plan with the  patient and family.    Parvez Erazo, OD, FAAO

## 2022-02-16 NOTE — NURSING NOTE
Chief Complaints and History of Present Illnesses   Patient presents with     COMPREHENSIVE EYE EXAM     Chief Complaint(s) and History of Present Illness(es)     COMPREHENSIVE EYE EXAM     Laterality: both eyes    Associated symptoms: Negative for dryness, eye pain, tearing, flashes and floaters    Treatments tried: no treatments    Pain scale: 0/10              Comments     Pt is seeing well cc, he usually has eye exam q2 years, had last one almost 2 years ago, no concerns about [facility] today.     POHx: denies injuries, sx, or lasers, no dx in past  Fhx: denies AMD, RD, or glaucoma                Maria Esther Cifuentes COT February 16, 2022 12:05 PM

## 2022-02-16 NOTE — PROGRESS NOTES
Sunny Pruitt comes into clinic today at the request of Dr. JATIN Aburto Ordering Provider for EKG.    This service provided today was under the supervising provider of the day Dr. JATIN Aburto, who was available if needed.    Jodie Holley, [facility]

## 2022-02-17 LAB — HCV AB SERPL QL IA: NONREACTIVE

## 2022-02-17 NOTE — PROGRESS NOTES
History and Physical Examination     SUBJECTIVE: Chief concern: preventive health review.     Past Medical History:  1.  Congenital pes cavus, status post right (2015 and left (2016) osteotomy procedures  2.  Status post right foot surgery to address calcaneal cuboid joint nonunion, 2016  3.  Right navicular cuneiform arthritis  4.  Status post left foot peroneal subluxation open reduction/internal fixation, 2019  5.  Status post right (2015) and left (2018) arthroscopic partial meniscectomies.  6.  Allergic rhinitis  7.  Status post hydrocelectomy, 12/2011  8.  History of maxillary sinusitis, 12/2021    Adverse Drug Reactions: None.     Current Medications:  Daily multivitamin supplement  Ibuprofen, 200 mg as needed  Loratadine, 10 mg daily as needed, primarily during spring months.  Calcium plus vitamin D3 supplement, one tablet daily     Habits:  Tobacco: None since 2010; history of 5 pack-years (10 years ×1/2 pack per day) of previous use  Alcohol: 1 serving, 6 days per week  Caffeine: 3 servings of coffee per day     Social History:  to Ed Reis, an [place] native executive at [employer].  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  Ed accepted a position with [employer], which led to an overseas assignment in [facility], at which point Sunny chose to leave academic to support Ed's career.  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  He enjoys outdoor activities, including cycling, cross-country skiing, and walking their poodle mix dog.  Typically, he exercises for 40-45 minutes per day, rotating workouts among free weights, stationary biking, and machine  "rowing, followed by stretching sessions or yoga.    Family History: Father is in good health at age 89.  Mother is 89, with history of dyslipidemia, skin cancer, and TIA.  A sister 1 year his senior is in good health, with history of malignant chordoma of the coccyx for which she underwent surgery in approximately .  A biological daughter for whose mother he was a sperm donor has scoliosis and unspecified arthritis at age 19.  Maternal grandmother  at age 87, with history of TIAs and dementia at advanced age.  Maternal grandfather  at age 74; details of health history not known.  Paternal grandmother  at advanced age.  Paternal grandfather  at age 84, with history of type 2 diabetes mellitus and dementia, diagnosed at age 82.     Review of Systems: Recent (2022) episode of vertigo, evaluated emergently and deemed most consistent with BPPV, based on examination and response to meclizine, ondansetron, and intravenous fluids.  Symptoms improved \"by 90%\" by the following day and are now \"95% better\" than at peak.  He notes a minimal transient \"swimming\" sensation when he bends at the waist from a standing position, such as when tying shoes; transient symptoms recur when he returns to an upright position.  He notes minimal nausea and and intermittent 1/10 headache, symptoms he would attributed to a URI if he had sinus congestion, rhinorrhea or other suggestive symptoms.  The evening before the onset of symptoms.  He had spent 30 minutes in a sauna, then walked 10 minutes to reach home on a very cold evening; he had been instructed to fast overnight for planned laboratory tests.  His symptoms were associated with a low home temperature reading; during the ER evaluation, his temperature was closer to normal.  He is not aware of hearing loss with the event and did not experience vomiting, severe headache, change in vision, or focal weakness or sensory loss.  Following the multiple surgical procedures " on his feet, he has noted marked improvement of previous problems with pain and stiffness; generally, his discomfort is 0/10 on the left and 2-3/10 on the right with avoidance of high-impact activities.  He describes mild tinnitus, which has progressed over the past 2 years.  No history of colonoscopy.  IPV was administered in 1/2012.  Most recent tetanus (Td AP).  This was administered in 1/2012.  Hepatitis A/hepatitis B vaccination series was completed in 11/2018.  Typhoid IM was administered in 1/2012.  Covid (Pfizer) vaccinations were administered on 4/3, or/24, and 12/11/2021.  Remainder of complete review of systems was negative.    OBJECTIVE:     Vital signs: Height 74.75  Inches.  Weight 192 pounds.  Blood pressure 119/77 on average of 3 automated readings.  Heart rate 58, respiratory rate 16.  Temperature 98.2 degrees.  O2 saturation 99% on room air.  General: Alert, neatly dressed and groomed, in no acute distress.  HEENT: Atraumatic and normocephalic. Eyelids, pupils, and conjunctivae appeared normal. Lips, teeth and gums appear normal.  Oropharynx showed moist mucous membranes, without exudate or erythema.  Neck: Supple, without thyromegaly, mass, or bruit. No cervical or supraclavicular lymphadenopathy.  Back: No spinal or costovertebral angle tenderness.  Chest: Clear to auscultation and percussion. Normal respiratory effort.  Cardiovascular: No jugular venous distention. Regular rate and rhythm, normal S1, S2 without murmur.  Abdomen: Bowel sounds positive; soft, nontender, without rebound, guarding, hepatosplenomegaly or mass.  Extremities: No cyanosis or edema.  Genitalia: Normal male genitalia, without scrotal mass or hernia. No inguinal lymphadenopathy.  Rectal: Normal tone, with smooth, nontender, nonenlarged prostate. No rectal mass.  Skin: Examination was deferred; full evaluation was completed earlier in day through dermatology clinic.  Neurologic: Cranial nerves II-XII were grossly intact.  "Sensory and motor examinations were normal. Normal gait.  Mini-cog score was 5/5.  Barany maneuvers in each of 3 planes did not elicit obvious nystagmus, although minimal transient \"swimming\" sensation was noted by the patient with rotation of the head to left and after sitting upright following rotation of the head to the right.  Psychiatric: Alert and oriented ×3. Normal affect. Judgment and insight intact.  MICHAEL-7 and PHQ-2 scores were 0.    Creatinine 1.10, alkaline phosphatase 82, ALT 23, cholesterol 157, HDL 49, LDL 93, triglycerides 77, cholesterol/HDL 3.2, PSA 0.57, TSH 4.19, 25-hydroxy vitamin D 33, glucose 98, white blood cell count 5300, hemoglobin 16.4, platelets 190,000, HIV nonreactive, hepatitis C screen pending, urinalysis unremarkable.    EKG was unremarkable.  Spirometry showed an FEV1 of 4.58, with an FVC of 6.01; readings were 99% and 101% of predicted values, respectively.    DEXA showed normal bone density, with most negative and valid Z-score of -1.4 at the level of the right total hip.  Body composition analysis showed 28.5% fat (30th percentile).     ASSESSMENT:    1.  Vertigo.  Presumably BPPV based on ER evaluation and prompt improvement.  Minimal residual symptoms with ENT consultation planned in the near future.  With virtual resolution of symptoms, he will forego vestibular therapy.  He agrees to arrange emergent evaluation in the event of severe headache, vomiting, or pronounced progression of symptoms.    2.  Asymmetric hearing loss.  Relatively minimal asymmetry; at the recommendation of audiology, ENT consultation as requested by ER staff on 2/9/2022 will be pursued, with discussion of problem #1 at that time.    3.  Subclinical hypothyroidism.  No problems with cold intolerance, fatigue, unexplained weight gain, or constipation.  We discussed the implications of this diagnosis and the importance of repeating a TSH level within 12 months.    4.  Preventive care.  Urology consultation " to discuss vasectomy will be arranged; his wife would like to discontinue use of oral contraceptives.  Colonoscopy was recommended.  Tetanus (Td) booster was provided at the time of his appointment, bringing his immunization status up-to-date.  Estimated 10-year risk of a vascular event is 1.8%.  Based on AHA/ACC guidelines (optimal for his cohort is 1.7%).  We reviewed the elements of a healthful diet and the importance of continuing a regimen of regular exercise.  He was congratulated for his low body fat percentage.  I recommended daily use of a 50 mcg vitamin D3 supplement, while maintaining daily calcium intake of 1200 mg, preferably from dietary sources.  We reviewed the debate regarding the benefit of multivitamin supplements and the importance of selecting a product that does not contain iron should he choose to proceed.    PLAN: See above.     ~SRT

## 2022-02-18 ENCOUNTER — TELEPHONE (OUTPATIENT)
Dept: GASTROENTEROLOGY | Facility: CLINIC | Age: 47
End: 2022-02-18
Payer: COMMERCIAL

## 2022-02-18 DIAGNOSIS — Z11.59 ENCOUNTER FOR SCREENING FOR OTHER VIRAL DISEASES: Primary | ICD-10-CM

## 2022-02-18 NOTE — TELEPHONE ENCOUNTER
Screening Questions  Blue=prep questions Red=location Green=sedation   1. Are you active on mychart? Y    2. What insurance is in the chart? [facility]      3.  Ordering/Referring Provider: Derrick Aburto MD     4. BMI 23.7, If greater than 40 review exclusion criteria also will need EXTENDED PREP    5.  Respiratory Screening (If yes to any of the following HOSPITAL setting only):     Do you use daily home oxygen? N  Do you have mod to severe Obstructive Sleep Apnea? N (can be seen at [facility] or hospital setting)    Do you have Pulmonary Hypertension? N   Do you have UNCONTROLLED asthma? N    6. Have you had a heart or lung transplant? N  (If yes, please review exclusion criteria)    7. Are you currently on dialysis? N  (If yes, schedule in HOSPITAL setting only)(If yes, please send Golytely prep)    8. Do you have chronic kidney disease? N (If yes, please send Golytely prep)    9. Have you had a stroke or Transient ischemic attack (TIA) within 6 months? N (If yes, do not schedule at [facility])    10. In the past 6 months, have you had any heart related issues including cardiomyopathy or heart attack? N (If yes, please review exclusion criteria)           If yes, did it require cardiac stenting or other implantable device?  (If yes, please review exclusion criteria)      11. Do you have any implantable devices in your body (pacemaker, defib, LVAD)? N (If yes, schedule at [facility])    12. Do you take nitroglycerin? If yes, how often? N (if yes, schedule at HOSPITAL setting)    13. Are you currently taking any blood thinners? N (If yes- inform patient to follow up with PCP or provider for follow up instructions)     14. Are you a diabetic? N (If yes, please send Golytely prep)    15. (Females) Are you currently pregnant?   If yes, how many weeks?      16. Are you taking any prescription pain medications on a routine schedule? N If yes, MAC sedation and patient will need EXTENDED PREP.    17. Do you have any chemical dependencies  such as alcohol, street drugs, or methadone? N If yes, MAC sedation     18. Do you have any history of post-traumatic stress syndrome, severe anxiety or history of psychosis? N  If yes, MAC sedation.     19. Do you transfer independently? Y    20.  Do you have any issues with constipation? N   If yes, pt will need EXTENDED PREP     21. Preferred Pharmacy for Pre Prescription [id number] - [place], [place] - [address]    Scheduling Details    Which Colonoscopy Prep was Sent?: Miralax  Type of Procedure Scheduled: 3/21  Surgeon: Rupinder  Date of Procedure: 3/21  [place] [facility] (Please ask for phone number if not scheduled by patient): Sunny Pruitt      Sedation Type: CS  Conscious Sedation- Needs  for 6 hours after the procedure  MAC/General-Needs  for 24 hours after procedure    Pre-op Required at [facility] [place], [place] and OR for MAC sedation:   (if yes advise patient they will need a pre-op prior to procedure)      Informed patient they will need an adult  Y    Cannot take any type of public or medical transportation alone    Pre-Procedure Covid test to be completed at [facility] or Externally: Yes at [facility]    Confirmed Nurse will call to complete assessment Y    Additional comments:  (DE POLOEN'S PATIENTS NEED EXTENDED PREP)

## 2022-02-19 LAB
ATRIAL RATE - MUSE: 58 BPM
DIASTOLIC BLOOD PRESSURE - MUSE: NORMAL MMHG
INTERPRETATION ECG - MUSE: NORMAL
P AXIS - MUSE: 69 DEGREES
PR INTERVAL - MUSE: 168 MS
QRS DURATION - MUSE: 98 MS
QT - MUSE: 406 MS
QTC - MUSE: 398 MS
R AXIS - MUSE: 60 DEGREES
SYSTOLIC BLOOD PRESSURE - MUSE: NORMAL MMHG
T AXIS - MUSE: 45 DEGREES
VENTRICULAR RATE- MUSE: 58 BPM

## 2022-02-22 NOTE — TELEPHONE ENCOUNTER
1. Have you noticed any changes in hearing? Yes  2. Do you have ringing, buzzing, or other sounds in your ears or head, this is also referred to as Tinnitus? Yes  3. When and where was your last hearing test? 3/14/22 [facility]  4. Do you feel lightheaded or foggy? Yes  5. Do you have a spinning sensation? No  6. Is there any specific position that can bring on dizziness? random  7. Does looking up cause dizziness? No  8. Does getting in and our of bed cause dizziness? Yes  9. Does turning over in bed increase or cause dizziness? No  10. Does bending over cause dizziness? Yes  11. Is there anything that you can do to prevent the dizziness? rrun its course  12. Has the dizziness gotten better with time? No  13. Have you seen Physical Therapy for dizziness? (Please indicate clinic and as much of the location as possible): No  14. Are you being referred to a specific physician? No  15. Have you been evaluated/treated for your dizziness at any other location?  (If yes,obtian as much clinic/provider/locaiton as possible) No. (If yes answer the following questions:)   Have you seen any ENT, Neurology, or other providers for these symptoms?             No   Have you had any balance or Audiology testing? No Have you had an MRI or CT scan of your head or neck? No    Would you like to receive your Release of Information by mail or e-mail?  e-mail

## 2022-02-22 NOTE — TELEPHONE ENCOUNTER
FUTURE VISIT INFORMATION      FUTURE VISIT INFORMATION:    Date: 5/17/22    Time: 10:15AM    Location[place] [facility] [place]  REFERRAL INFORMATION:    Referring provider:  Derrick Aburto MD    Referring providers clinic:  [facility]     Reason for visit/diagnosis  Vertigo- Referred by Derrick Aburto MD in [facility] IM SIGNATURE PROGRAM    RECORDS REQUESTED FROM:       Clinic name Comments Records Status Imaging Status   [facility]  2/16/22 note and referral from Derrick Aburto MD [facility] [place] 2/16/22 audio and note from [facility] ED 2/9/22 ED note from Dr Alejandra Mejia EPIC                        2/22/22 2:26PM sent a message to audiology to review - Amay

## 2022-03-01 NOTE — TELEPHONE ENCOUNTER
MEDICAL RECORDS REQUEST   [facility]  Urology Clinic  [address] [place] [address]  PHONE: [phone]  Fax: [phone]        FUTURE VISIT INFORMATION                                                   Sunny Pruitt, : 1975 scheduled for future visit at [facility]    APPOINTMENT INFORMATION:    Date: 22    Provider:  See Sosa MD    Reason for Visit/Diagnosis: scheduled per pt. Vasectomy consult, med recs in [facility]    REFERRAL INFORMATION:    Referring provider:  Derrick Aburto MD    Specialty: Internal Med    Referring providers clinic:  [facility]    Clinic contact number:      RECORDS REQUESTED FOR VISIT                                                     NOTES  STATUS/DETAILS   OFFICE NOTE from referring provider  yes   22 OV with Derrick Aburto MD   OFFICE NOTE from other specialist  no   DISCHARGE SUMMARY from hospital  no   DISCHARGE REPORT from the ER  no   OPERATIVE REPORT  no   MEDICATION LIST  yes   LABS     URINALYSIS (UA)  yes 22   URINE CYTOLOGY  no     PRE-VISIT CHECKLIST      Record collection complete Yes   Appointment appropriately scheduled           (right time/right provider) Yes   Joint diagnostic appointment coordinated correctly          (ensure right order & amount of time) No   MyChart activation Yes   Questionnaire complete N/A

## 2022-03-10 ENCOUNTER — TELEPHONE (OUTPATIENT)
Dept: GASTROENTEROLOGY | Facility: CLINIC | Age: 47
End: 2022-03-10

## 2022-03-10 NOTE — TELEPHONE ENCOUNTER
Patient scheduled for colonoscopy  on 3/21/22.     Covid test scheduled: 3/17/22    Arrival time: 1245    Facility location: [facility]    Sedation type: CS    Indication for procedure: screening    Anticoagulations? no     Bowel prep recommendation: Miralax/Magnesium citrate/Dulcolax     Pre visit planning completed.    Nydia Fleming RN

## 2022-03-12 NOTE — TELEPHONE ENCOUNTER
Attempted to contact patient for pre assessment questions. No answer.     Left message to return call to [phone] #2    Nydia Fleming RN

## 2022-03-14 DIAGNOSIS — R42 DIZZINESS: Primary | ICD-10-CM

## 2022-03-14 NOTE — TELEPHONE ENCOUNTER
"Requesting orders for vestibular physical therapy evaluation prior to patient's ENT appointment with Rick Nissen, M.D. If patient is still having ongoing symptoms following physical therapy, VNG testing can be ordered.      Chart Review: Per 2/16/22 note from Derrick Aburto M.D.:  Recent (2/9/2022) episode of vertigo, evaluated emergently and deemed most consistent with BPPV, based on examination and response to meclizine, ondansetron, and intravenous fluids.  Symptoms improved \"by 90%\" by the following day and are now \"95% better\" than at peak.  He notes a minimal transient \"swimming\" sensation when he bends at the waist from a standing position, such as when tying shoes; transient symptoms recur when he returns to an upright position.     Dizzy Intake questionnaire: Patient reports dizziness triggered by getting in and out of bed, and bending over.    Hearing evaluation completed 2/16/22 revealed normal hearing in the right ear, and normal hearing sloping to mild SNHL 3-4 kHz, rising to normal hearing in the left ear. 10-15 dB asymmetry between the ears noted 3-8 kHz      Sabina Posey.  Licensed Audiologist  [place] # [id number]            "

## 2022-03-14 NOTE — TELEPHONE ENCOUNTER
Pre assessment questions completed for upcoming colonoscopy procedure scheduled on 3/21/22    COVID test scheduled 3/17/22    Reviewed procedural arrival time 1245 and facility location [facility].    Designated  policy reviewed. Instructed to have someone stay 6 hours post procedure.     Reviewed Miralax prep instructions with patient. No fiber/iron supplements or foods that contain nuts/seeds 7 days prior to procedure.     Patient verbalized understanding and had no questions or concerns at this time.    Nydia Fleming RN

## 2022-03-17 ENCOUNTER — LAB (OUTPATIENT)
Dept: LAB | Facility: CLINIC | Age: 47
End: 2022-03-17

## 2022-03-17 DIAGNOSIS — Z11.59 ENCOUNTER FOR SCREENING FOR OTHER VIRAL DISEASES: ICD-10-CM

## 2022-03-17 PROCEDURE — U0005 INFEC AGEN DETEC AMPLI PROBE: HCPCS | Performed by: INTERNAL MEDICINE

## 2022-03-18 LAB — SARS-COV-2 RNA RESP QL NAA+PROBE: NEGATIVE

## 2022-03-20 ENCOUNTER — ANESTHESIA EVENT (OUTPATIENT)
Dept: SURGERY | Facility: AMBULATORY SURGERY CENTER | Age: 47
End: 2022-03-20
Payer: COMMERCIAL

## 2022-03-21 ENCOUNTER — ANESTHESIA (OUTPATIENT)
Dept: SURGERY | Facility: AMBULATORY SURGERY CENTER | Age: 47
End: 2022-03-21
Payer: COMMERCIAL

## 2022-03-21 ENCOUNTER — HOSPITAL ENCOUNTER (OUTPATIENT)
Facility: AMBULATORY SURGERY CENTER | Age: 47
Discharge: HOME | End: 2022-03-21
Attending: INTERNAL MEDICINE
Payer: COMMERCIAL

## 2022-03-21 VITALS
BODY MASS INDEX: 24.25 KG/M2 | RESPIRATION RATE: 16 BRPM | HEIGHT: 75 IN | SYSTOLIC BLOOD PRESSURE: 120 MMHG | HEART RATE: 76 BPM | TEMPERATURE: 98.1 F | OXYGEN SATURATION: 97 % | DIASTOLIC BLOOD PRESSURE: 78 MMHG | WEIGHT: 195 LBS

## 2022-03-21 VITALS — HEART RATE: 62 BPM

## 2022-03-21 LAB — COLONOSCOPY: NORMAL

## 2022-03-21 PROCEDURE — 45378 DIAGNOSTIC COLONOSCOPY: CPT

## 2022-03-21 RX ORDER — NALOXONE HYDROCHLORIDE 0.4 MG/ML
0.2 INJECTION, SOLUTION INTRAMUSCULAR; INTRAVENOUS; SUBCUTANEOUS
Status: DISCONTINUED | OUTPATIENT
Start: 2022-03-21 | End: 2022-03-22 | Stop reason: HOSPADM

## 2022-03-21 RX ORDER — FLUMAZENIL 0.1 MG/ML
0.2 INJECTION, SOLUTION INTRAVENOUS
Status: DISCONTINUED | OUTPATIENT
Start: 2022-03-21 | End: 2022-03-22 | Stop reason: HOSPADM

## 2022-03-21 RX ORDER — PROPOFOL 10 MG/ML
INJECTION, EMULSION INTRAVENOUS PRN
Status: DISCONTINUED | OUTPATIENT
Start: 2022-03-21 | End: 2022-03-21

## 2022-03-21 RX ORDER — ONDANSETRON 2 MG/ML
4 INJECTION INTRAMUSCULAR; INTRAVENOUS
Status: DISCONTINUED | OUTPATIENT
Start: 2022-03-21 | End: 2022-03-21 | Stop reason: HOSPADM

## 2022-03-21 RX ORDER — LIDOCAINE 40 MG/G
CREAM TOPICAL
Status: DISCONTINUED | OUTPATIENT
Start: 2022-03-21 | End: 2022-03-21 | Stop reason: HOSPADM

## 2022-03-21 RX ORDER — ONDANSETRON 2 MG/ML
4 INJECTION INTRAMUSCULAR; INTRAVENOUS EVERY 6 HOURS PRN
Status: DISCONTINUED | OUTPATIENT
Start: 2022-03-21 | End: 2022-03-22 | Stop reason: HOSPADM

## 2022-03-21 RX ORDER — ONDANSETRON 4 MG/1
4 TABLET, ORALLY DISINTEGRATING ORAL EVERY 6 HOURS PRN
Status: DISCONTINUED | OUTPATIENT
Start: 2022-03-21 | End: 2022-03-22 | Stop reason: HOSPADM

## 2022-03-21 RX ORDER — SIMETHICONE
LIQUID (ML) MISCELLANEOUS PRN
Status: DISCONTINUED | OUTPATIENT
Start: 2022-03-21 | End: 2022-03-21 | Stop reason: HOSPADM

## 2022-03-21 RX ORDER — LIDOCAINE HYDROCHLORIDE 20 MG/ML
INJECTION, SOLUTION INFILTRATION; PERINEURAL PRN
Status: DISCONTINUED | OUTPATIENT
Start: 2022-03-21 | End: 2022-03-21

## 2022-03-21 RX ORDER — PROPOFOL 10 MG/ML
INJECTION, EMULSION INTRAVENOUS CONTINUOUS PRN
Status: DISCONTINUED | OUTPATIENT
Start: 2022-03-21 | End: 2022-03-21

## 2022-03-21 RX ORDER — NALOXONE HYDROCHLORIDE 0.4 MG/ML
0.4 INJECTION, SOLUTION INTRAMUSCULAR; INTRAVENOUS; SUBCUTANEOUS
Status: DISCONTINUED | OUTPATIENT
Start: 2022-03-21 | End: 2022-03-22 | Stop reason: HOSPADM

## 2022-03-21 RX ORDER — SODIUM CHLORIDE, SODIUM LACTATE, POTASSIUM CHLORIDE, CALCIUM CHLORIDE 600; 310; 30; 20 MG/100ML; MG/100ML; MG/100ML; MG/100ML
500 INJECTION, SOLUTION INTRAVENOUS CONTINUOUS
Status: DISCONTINUED | OUTPATIENT
Start: 2022-03-21 | End: 2022-03-21 | Stop reason: HOSPADM

## 2022-03-21 RX ORDER — PROCHLORPERAZINE MALEATE 10 MG
10 TABLET ORAL EVERY 6 HOURS PRN
Status: DISCONTINUED | OUTPATIENT
Start: 2022-03-21 | End: 2022-03-22 | Stop reason: HOSPADM

## 2022-03-21 RX ADMIN — PROPOFOL 200 MCG/KG/MIN: 10 INJECTION, EMULSION INTRAVENOUS at 14:00

## 2022-03-21 RX ADMIN — LIDOCAINE HYDROCHLORIDE 60 MG: 20 INJECTION, SOLUTION INFILTRATION; PERINEURAL at 14:00

## 2022-03-21 RX ADMIN — SODIUM CHLORIDE, SODIUM LACTATE, POTASSIUM CHLORIDE, CALCIUM CHLORIDE: 600; 310; 30; 20 INJECTION, SOLUTION INTRAVENOUS at 13:56

## 2022-03-21 RX ADMIN — PROPOFOL 100 MG: 10 INJECTION, EMULSION INTRAVENOUS at 14:00

## 2022-03-21 NOTE — ANESTHESIA CARE TRANSFER NOTE
Patient: Sunny Pruitt    Procedure: Procedure(s):  COLONOSCOPY       Diagnosis: Encounter for preventative adult health care examination [Z00.00]  Diagnosis Additional Information: No value filed.    Anesthesia Type:   MAC     Note:    Oropharynx: oropharynx clear of all foreign objects and spontaneously breathing  Level of Consciousness: drowsy  Oxygen Supplementation: room air    Independent Airway: airway patency satisfactory and stable  Dentition: dentition unchanged  Vital Signs Stable: post-procedure vital signs reviewed and stable  Report to RN Given: handoff report given  Patient transferred to: Phase II    Handoff Report: Identifed the Patient, Identified the Reponsible Provider, Reviewed the pertinent medical history, Discussed the surgical course, Reviewed Intra-OP anesthesia mangement and issues during anesthesia, Set expectations for post-procedure period and Allowed opportunity for questions and acknowledgement of understanding      Vitals:  Vitals Value Taken Time   BP     Temp     Pulse 62 03/21/22 1415   Resp     SpO2         Electronically Signed By: MATT Salazar CRNA  March 21, 2022  2:19 PM

## 2022-03-21 NOTE — H&P
Sunny Nagelk  [id number]  male  47 year old      Reason for procedure/surgery: screen    Patient Active Problem List   Diagnosis     CARDIOVASCULAR SCREENING; LDL GOAL LESS THAN 160       Past Surgical History:  History reviewed. No pertinent surgical history.    Past Medical History: History reviewed. No pertinent past medical history.    Social History:   Social History     Tobacco Use     Smoking status: Former Smoker     Packs/day: 0.00     Years: 10.00     Pack years: 0.00     Types: Cigarettes     Start date: 1991     Quit date: 2001     Years since quittin.2     Smokeless tobacco: Never Used   Substance Use Topics     Alcohol use: Yes     Comment: rarely       Family History:   Family History   Problem Relation Age of Onset     Skin Cancer Mother      Ulcerative Colitis Mother      Hyperlipidemia Mother      Cerebrovascular Disease Mother         TIA     Cancer Sister         cancer of tailbone     Cancer Sister      Other Cancer Sister         Malignant chordoma     Dementia Maternal Grandmother      Alzheimer Disease Paternal Grandfather      Diabetes Type 2  Paternal Grandfather      Glaucoma No family hx of      Macular Degeneration No family hx of        Allergies: No Known Allergies    Active Medications:   Current Outpatient Medications   Medication Sig Dispense Refill     IBUPROFEN PO Take 400 mg by mouth as needed for moderate pain       loratadine (CLARITIN) 10 MG tablet Take 10 mg by mouth daily       meclizine (ANTIVERT) 12.5 MG tablet Take 2 tablets (25 mg) by mouth 4 times daily as needed for dizziness 30 tablet 0     Specialty Vitamins Products (VITAMINS FOR HAIR) CAPS Take 1 tablet by mouth daily         Systemic Review:   CONSTITUTIONAL: NEGATIVE for fever, chills, change in weight  ENT/MOUTH: NEGATIVE for ear, mouth and throat problems  RESP: NEGATIVE for significant cough or SOB  CV: NEGATIVE for chest pain, palpitations or peripheral edema    Physical Examination:   Vital  "Signs: /84 (BP Location: Right arm)   Pulse 79   Temp 97.6  F (36.4  C) (Temporal)   Resp 18   Ht 1.905 m (6' 3\")   Wt 88.5 kg (195 lb)   SpO2 98%   BMI 24.37 kg/m    GENERAL: healthy, alert and no distress  NECK: no adenopathy, no asymmetry, masses, or scars  RESP: lungs clear to auscultation - no rales, rhonchi or wheezes  CV: regular rate and rhythm, normal S1 S2, no S3 or S4, no murmur, click or rub, no peripheral edema and peripheral pulses strong  ABDOMEN: soft, nontender, no hepatosplenomegaly, no masses and bowel sounds normal  MS: no gross musculoskeletal defects noted, no edema    Plan: Appropriate to proceed as scheduled.      Robert Bucio MD  3/21/2022    PCP:  Berto Gonzalez    "

## 2022-03-26 NOTE — ANESTHESIA POSTPROCEDURE EVALUATION
Patient: Sunny Pruitt    Procedure: Procedure(s):  COLONOSCOPY       Anesthesia Type:  MAC    Note:  Disposition: Outpatient   Postop Pain Control: Uneventful            Sign Out: Well controlled pain   PONV: No   Neuro/Psych: Uneventful            Sign Out: Acceptable/Baseline neuro status   Airway/Respiratory: Uneventful            Sign Out: Acceptable/Baseline resp. status   CV/Hemodynamics: Uneventful            Sign Out: Acceptable CV status; No obvious hypovolemia; No obvious fluid overload   Other NRE: NONE   DID A NON-ROUTINE EVENT OCCUR? No           Last vitals:  Vitals Value Taken Time   /78 03/21/22 1440   Temp 36.7  C (98.1  F) 03/21/22 1440   Pulse 76 03/21/22 1440   Resp 16 03/21/22 1440   SpO2 97 % 03/21/22 1440       Electronically Signed By: Tiburcio Marrero MD  March 25, 2022  7:21 PM

## 2022-04-12 ENCOUNTER — PRE VISIT (OUTPATIENT)
Dept: UROLOGY | Facility: CLINIC | Age: 47
End: 2022-04-12
Payer: COMMERCIAL

## 2022-04-12 NOTE — TELEPHONE ENCOUNTER
Reason for visit: Consult     Relevant information: Vasectomy    Records/imaging/labs/orders: in EPIC    Pt called: No    At Rooming: Normal

## 2022-04-15 ENCOUNTER — PRE VISIT (OUTPATIENT)
Dept: UROLOGY | Facility: CLINIC | Age: 47
End: 2022-04-15
Payer: COMMERCIAL

## 2022-04-21 ENCOUNTER — VIRTUAL VISIT (OUTPATIENT)
Dept: UROLOGY | Facility: CLINIC | Age: 47
End: 2022-04-21
Attending: INTERNAL MEDICINE
Payer: COMMERCIAL

## 2022-04-21 DIAGNOSIS — Z30.09 ENCOUNTER FOR VASECTOMY COUNSELING: Primary | ICD-10-CM

## 2022-04-21 PROCEDURE — 99202 OFFICE O/P NEW SF 15 MIN: CPT | Mod: GT | Performed by: UROLOGY

## 2022-04-21 NOTE — LETTER
4/21/2022       RE: Sunny Pruitt  [address] [place] [address]     Dear Colleague,    Thank you for referring your patient, Sunny Pruitt, to the [facility] [place] at [facility]. Please see a copy of my visit note below.    Sunny is a 47 year old who is being evaluated via a billable video visit.      Video Visit Technology for this patient: Win Video Visit- Patient was left in waiting room    How would you like to obtain your AVS? MyChart  If the video visit is dropped, the invitation should be resent by: Send to e-mail at: [email or web address]  Will anyone else be joining your video visit? No      Video Start Time: 1:37 PM  Video-Visit Details    Type of service:  Video Visit    Video End Time:1:49 PM    Originating Location (pt. Location): Home    Distant Location (provider location):  [facility] [place]     Platform used for Video Visit: Win      CC: Desires sterilization, consult for vasectomy from Dr. Derrick Aburto     HPI: Sunny Pruitt is a 47 year old male with no children, and he is intersted in getting a vasectomy for sterilization.      No voiding problems.  No history of  trauma.  No hematuria  Normal sexual function.  No history of bleeding problems.    PMH:   No chronic medical problems    Past Surgical History:   Procedure Laterality Date     COLONOSCOPY N/A 3/21/2022    Procedure: COLONOSCOPY;  Surgeon: Robert Bucio MD;  Location: [facility] OR   hydrocele repair 2012  Foot and ankle surgery      FAMILY HX:   Family History   Problem Relation Age of Onset     Skin Cancer Mother      Ulcerative Colitis Mother      Hyperlipidemia Mother      Cerebrovascular Disease Mother         TIA     Cancer Sister         cancer of tailbone     Cancer Sister      Other Cancer Sister         Malignant chordoma     Dementia Maternal Grandmother      Alzheimer Disease Paternal Grandfather       Diabetes Type 2  Paternal Grandfather      Glaucoma No family hx of      Macular Degeneration No family hx of       No history of coagulopathies, no  malignancies.     ALLERGIES:    No Known Allergies    MEDS:   Current Outpatient Medications   Medication Sig     IBUPROFEN PO Take 400 mg by mouth as needed for moderate pain     loratadine (CLARITIN) 10 MG tablet Take 10 mg by mouth daily     No current facility-administered medications for this visit.       SOCIAL HISTORY:  Social History     Tobacco Use     Smoking status: Former Smoker     Packs/day: 0.00     Years: 10.00     Pack years: 0.00     Types: Cigarettes     Start date: 1991     Quit date: 2001     Years since quittin.3     Smokeless tobacco: Never Used   Substance Use Topics     Alcohol use: Yes     Comment: rarely     Drug use: No        REVIEW OF SYMPTOMS:   Denies testicular pain, ED, ejaculatory problems, rashes in the groin, easy bruising or bleeding.   No constitutional, eye, ENT, heart, lung, GI, musculoskeletal, skin, neurologic, psychiatric, endocrine or hematologic complaints.       GENERAL PHYSICAL EXAM:   Exam  General- Alert, oriented, nad.  Pleasant and conversant.  Eyes- anicteric, EOMI.  Resps- normal, non-labored.  No cough  Abdomen-  nondistended.   exam- deferred.   Neurological - no tremors  Skin - no discoloration/ lesions noted  Psychiatric - no anxiety, alert & oriented.       The rest of a comprehensive physical examination is deferred due to video visit restrictions.       I discussed with him at length the risks and benefits of the procedure. He understands that this is a sterilization procedure, and not reversible contraception. He understands that reversals, while possible, are not guaranteed to work and fairly complex. I discussed with him the option of sperm cryopreservation.     I stressed that he continues to be fertile in the post-operative period, and that he should continue using other contraceptive  methods, such as a condom, until he obtains a semen analysis and we review the results to confirm success of the procedure and infertility. I also stressed to him that recanalization and pregnancy can occur in about 1 per thousand cases, possibly more even after we clear him with a semen analysis showing no motile sperm. I counseled him on the viola-operative risks of bleeding, infection, pain.  I described to him post-vasectomy pain syndrome that can occur in about 1 to 2% of men undergoing vasectomy.     We also discussed recovery times (typically days if no complications) and post-operative care including use of ice packs, pain medication and wound care.    Schedule vasectomy procedure in clinic.       Additional Coding Information:    Problems:  One acute uncomplicated illness or injury    Data Reviewed  Minimal or none    Level of risk:   low risk (e.g., OTC medication or observation, minor surgery without risks)    Time spent:  12 minutes spent on the date of the encounter doing chart review, history and exam, documentation and further activities as noted above. This included the video chat time above.      Again, thank you for allowing me to participate in the care of your patient.      Sincerely,    See Sosa MD

## 2022-04-21 NOTE — PROGRESS NOTES
Sunny is a 47 year old who is being evaluated via a billable video visit.      Video Visit Technology for this patient: Win Video Visit- Patient was left in waiting room    How would you like to obtain your AVS? MyChart  If the video visit is dropped, the invitation should be resent by: Send to e-mail at: [email or web address]  Will anyone else be joining your video visit? No      Video Start Time: 1:37 PM  Video-Visit Details    Type of service:  Video Visit    Video End Time:1:49 PM    Originating Location (pt. Location): Home    Distant Location (provider location):  [facility] [place]     Platform used for Video Visit: Win      CC: Desires sterilization, consult for vasectomy from Dr. Derrick Aburto     HPI: Sunny Pruitt is a 47 year old male with no children, and he is intersted in getting a vasectomy for sterilization.      No voiding problems.  No history of  trauma.  No hematuria  Normal sexual function.  No history of bleeding problems.    PMH:   No chronic medical problems    Past Surgical History:   Procedure Laterality Date     COLONOSCOPY N/A 3/21/2022    Procedure: COLONOSCOPY;  Surgeon: Robert Bucio MD;  Location: [facility] OR   hydrocele repair 2012  Foot and ankle surgery      FAMILY HX:   Family History   Problem Relation Age of Onset     Skin Cancer Mother      Ulcerative Colitis Mother      Hyperlipidemia Mother      Cerebrovascular Disease Mother         TIA     Cancer Sister         cancer of tailbone     Cancer Sister      Other Cancer Sister         Malignant chordoma     Dementia Maternal Grandmother      Alzheimer Disease Paternal Grandfather      Diabetes Type 2  Paternal Grandfather      Glaucoma No family hx of      Macular Degeneration No family hx of       No history of coagulopathies, no  malignancies.     ALLERGIES:    No Known Allergies    MEDS:   Current Outpatient Medications   Medication Sig     IBUPROFEN PO Take 400 mg by mouth as needed for  moderate pain     loratadine (CLARITIN) 10 MG tablet Take 10 mg by mouth daily     No current facility-administered medications for this visit.       SOCIAL HISTORY:  Social History     Tobacco Use     Smoking status: Former Smoker     Packs/day: 0.00     Years: 10.00     Pack years: 0.00     Types: Cigarettes     Start date: 1991     Quit date: 2001     Years since quittin.3     Smokeless tobacco: Never Used   Substance Use Topics     Alcohol use: Yes     Comment: rarely     Drug use: No        REVIEW OF SYMPTOMS:   Denies testicular pain, ED, ejaculatory problems, rashes in the groin, easy bruising or bleeding.   No constitutional, eye, ENT, heart, lung, GI, musculoskeletal, skin, neurologic, psychiatric, endocrine or hematologic complaints.       GENERAL PHYSICAL EXAM:   Exam  General- Alert, oriented, nad.  Pleasant and conversant.  Eyes- anicteric, EOMI.  Resps- normal, non-labored.  No cough  Abdomen-  nondistended.   exam- deferred.   Neurological - no tremors  Skin - no discoloration/ lesions noted  Psychiatric - no anxiety, alert & oriented.       The rest of a comprehensive physical examination is deferred due to video visit restrictions.       I discussed with him at length the risks and benefits of the procedure. He understands that this is a sterilization procedure, and not reversible contraception. He understands that reversals, while possible, are not guaranteed to work and fairly complex. I discussed with him the option of sperm cryopreservation.     I stressed that he continues to be fertile in the post-operative period, and that he should continue using other contraceptive methods, such as a condom, until he obtains a semen analysis and we review the results to confirm success of the procedure and infertility. I also stressed to him that recanalization and pregnancy can occur in about 1 per thousand cases, possibly more even after we clear him with a semen analysis showing no motile  sperm. I counseled him on the viola-operative risks of bleeding, infection, pain.  I described to him post-vasectomy pain syndrome that can occur in about 1 to 2% of men undergoing vasectomy.     We also discussed recovery times (typically days if no complications) and post-operative care including use of ice packs, pain medication and wound care.    Schedule vasectomy procedure in clinic.       Additional Coding Information:    Problems:  One acute uncomplicated illness or injury    Data Reviewed  Minimal or none    Level of risk:   low risk (e.g., OTC medication or observation, minor surgery without risks)    Time spent:  12 minutes spent on the date of the encounter doing chart review, history and exam, documentation and further activities as noted above. This included the video chat time above.

## 2022-04-21 NOTE — NURSING NOTE
Chief Complaint   Patient presents with     Consult     Vasectomy       There were no vitals taken for this visit. There is no height or weight on file to calculate BMI.    Patient Active Problem List   Diagnosis     CARDIOVASCULAR SCREENING; LDL GOAL LESS THAN 160       No Known Allergies    Current Outpatient Medications   Medication Sig Dispense Refill     IBUPROFEN PO Take 400 mg by mouth as needed for moderate pain       loratadine (CLARITIN) 10 MG tablet Take 10 mg by mouth daily       meclizine (ANTIVERT) 12.5 MG tablet Take 2 tablets (25 mg) by mouth 4 times daily as needed for dizziness 30 tablet 0     Specialty Vitamins Products (VITAMINS FOR HAIR) CAPS Take 1 tablet by mouth daily         Social History     Tobacco Use     Smoking status: Former Smoker     Packs/day: 0.00     Years: 10.00     Pack years: 0.00     Types: Cigarettes     Start date: 1991     Quit date: 2001     Years since quittin.3     Smokeless tobacco: Never Used   Substance Use Topics     Alcohol use: Yes     Comment: rarely     Drug use: No       Rodney Brewer EMT  2022  1:29 PM

## 2022-04-27 ENCOUNTER — THERAPY VISIT (OUTPATIENT)
Dept: PHYSICAL THERAPY | Facility: CLINIC | Age: 47
End: 2022-04-27
Payer: COMMERCIAL

## 2022-04-27 DIAGNOSIS — R42 DIZZINESS: ICD-10-CM

## 2022-04-27 PROCEDURE — 97161 PT EVAL LOW COMPLEX 20 MIN: CPT | Mod: GP | Performed by: PHYSICAL THERAPIST

## 2022-05-05 ENCOUNTER — PRE VISIT (OUTPATIENT)
Dept: UROLOGY | Facility: CLINIC | Age: 47
End: 2022-05-05

## 2022-05-17 ENCOUNTER — PRE VISIT (OUTPATIENT)
Dept: OTOLARYNGOLOGY | Facility: CLINIC | Age: 47
End: 2022-05-17

## 2022-06-28 ENCOUNTER — PRE VISIT (OUTPATIENT)
Dept: UROLOGY | Facility: CLINIC | Age: 47
End: 2022-06-28

## 2022-06-28 NOTE — TELEPHONE ENCOUNTER
Reason for visit: Vasectomy        Relevant information: n/a     Records/imaging/labs/orders: in EPIC     Pt called: no; Will call patient if no response to [employer] message     At Rooming: normal vas

## 2022-08-18 ENCOUNTER — OFFICE VISIT (OUTPATIENT)
Dept: UROLOGY | Facility: CLINIC | Age: 47
End: 2022-08-18
Payer: COMMERCIAL

## 2022-08-18 VITALS
BODY MASS INDEX: 23.62 KG/M2 | WEIGHT: 190 LBS | SYSTOLIC BLOOD PRESSURE: 124 MMHG | HEART RATE: 80 BPM | HEIGHT: 75 IN | DIASTOLIC BLOOD PRESSURE: 81 MMHG

## 2022-08-18 DIAGNOSIS — Z30.09 ENCOUNTER FOR VASECTOMY COUNSELING: Primary | ICD-10-CM

## 2022-08-18 PROCEDURE — 55250 REMOVAL OF SPERM DUCT(S): CPT | Performed by: UROLOGY

## 2022-08-18 RX ORDER — LIDOCAINE HYDROCHLORIDE 20 MG/ML
20 INJECTION, SOLUTION EPIDURAL; INFILTRATION; INTRACAUDAL; PERINEURAL ONCE
Status: DISCONTINUED | OUTPATIENT
Start: 2022-08-18 | End: 2023-06-01

## 2022-08-18 ASSESSMENT — PAIN SCALES - GENERAL: PAINLEVEL: NO PAIN (0)

## 2022-08-18 NOTE — LETTER
8/18/2022       RE: Sunny Pruitt  [address] [place] [address]     Dear Colleague,    Thank you for referring your patient, Sunny Pruitt, to the [facility] [place] at [facility]. Please see a copy of my visit note below.    Procedure: Vasectomy bilateral.   Anesthesia: Local lidocaine injection by surgeon.  Surgeon: HIGINIO Sosa M.D.   Assistant:  none    Description of procedure: After the patient received education material regarding vasectomy, including risks and benefits, we proceeded with obtaining consent and proceeded with the surgery. He understands that there is a risk of late failure from recanalization. He understands that he is fertile until he has completed one or more semen analyses and he is given clearance from us for unprotected intercourse.  He understands that we will contact regarding the results but it is his responsibility to make sure he is cleared before having unprotected intercourse.  I have discussed with him other risks including bleeding, infection, acute and possible long-term pain.    The right vas was ligated first.  This was done using the standard technique by grasping it with a three-finger  and lifting it up to the skin.  A small amount of lidocaine was infiltrated into the skin and vasal sheath.  The skin was punctured and dilated bluntly using the scalpel-less dissector.  The sheath was identified and a rigid clamp was passed around the vas which was then lifted out of the incision.  The vas was cleaned off from the deferential vessels and the sheath, and cautery was used to divide the vas between mosquitos.  A small segment was removed and discarded.  The lumen of the vas was cannulated to confirm its identity, and the lumens of both ends were cauterized.  Pen cautery was used sparingly/as needed for minor bleeders.  A facial interposition was then performed with a single suture of 4-0  chromic. The skin defect was closed with a 4-0 chromic interrupted suture after confirming adequate hemostasis.       We then turned our attention to the left side and a similar technique was performed. This involved division, excision of a segment, cautery of the lumens, and fascial interposition.    There were no hematomas noted at the end of the procedure.  The patient tolerated the procedure well.    He was advised to return for a post vasectomy semen check in 2-3 months, and that he is not sterile and must continue to use contraception until we tell him otherwise (based on follow-up semen specimen(s)).    Plan:  -Post vasectomy semen analysis in 2-3 months   -ice packs to scrotum X 24h  -shower ok tomorrow  -OTC analgesics recommended     Neha FENG

## 2022-08-18 NOTE — PATIENT INSTRUCTIONS
What Can I Expect After My Vasectomy?     Your scrotum may be swollen and bruised. We suggest you:  - Raise the area and apply ice packs (or frozen vegetables). Use the ice packs for 20 minutes on and 20 minutes off for 24 to 36 hours.  - Wear a jock strap or tight briefs for support for a week.  - Limit your activity for the first 24 to 36 hours. Wait up to 48 hours, if you feel the need. No heavy lifting for 1 week.     You should be able to return to work the next day, unless you do heavy physical work.     You can safely ejaculate (have a sexual climax) in about one week, or when it feels comfortable.    Risk of pregnancy    After your vasectomy, you can still get your partner pregnant for three months or more. Use extra birth control, such as condoms, until tests show that you are sterile (no live sperm).    Vasectomy is not 100 percent reliable. Pregnancy may occur for about 1 out of 2000 men even after testing shows zero sperm count.    Can a vasectomy be reversed?    Vasectomy is meant to be birth control that lasts a lifetime. If you change your mind, we can try to reverse it with surgery. But this will not be successful in every case.    Sperm count test    You will have a sperm-count test after the vasectomy. You should have had at least 20 ejaculations (discharges of semen) since your surgery. It will be a few months before you are sterile. Ten to twelve weeks after your surgery, schedule a sperm count test. Call [phone] to schedule. We will call you in 2 weeks with the results.    If you have any questions, please contact us:    [facility] (nurse line): [phone]

## 2022-08-18 NOTE — PROGRESS NOTES
Procedure: Vasectomy bilateral.   Anesthesia: Local lidocaine injection by surgeon.  Surgeon: HIGINIO Sosa M.D.   Assistant:  none    Description of procedure: After the patient received education material regarding vasectomy, including risks and benefits, we proceeded with obtaining consent and proceeded with the surgery. He understands that there is a risk of late failure from recanalization. He understands that he is fertile until he has completed one or more semen analyses and he is given clearance from us for unprotected intercourse.  He understands that we will contact regarding the results but it is his responsibility to make sure he is cleared before having unprotected intercourse.  I have discussed with him other risks including bleeding, infection, acute and possible long-term pain.    The right vas was ligated first.  This was done using the standard technique by grasping it with a three-finger  and lifting it up to the skin.  A small amount of lidocaine was infiltrated into the skin and vasal sheath.  The skin was punctured and dilated bluntly using the scalpel-less dissector.  The sheath was identified and a rigid clamp was passed around the vas which was then lifted out of the incision.  The vas was cleaned off from the deferential vessels and the sheath, and cautery was used to divide the vas between mosquitos.  A small segment was removed and discarded.  The lumen of the vas was cannulated to confirm its identity, and the lumens of both ends were cauterized.  Pen cautery was used sparingly/as needed for minor bleeders.  A facial interposition was then performed with a single suture of 4-0 chromic. The skin defect was closed with a 4-0 chromic interrupted suture after confirming adequate hemostasis.       We then turned our attention to the left side and a similar technique was performed. This involved division, excision of a segment, cautery of the lumens, and fascial interposition.    There were no  hematomas noted at the end of the procedure.  The patient tolerated the procedure well.    He was advised to return for a post vasectomy semen check in 2-3 months, and that he is not sterile and must continue to use contraception until we tell him otherwise (based on follow-up semen specimen(s)).    Plan:  -Post vasectomy semen analysis in 2-3 months   -ice packs to scrotum X 24h  -shower ok tomorrow  -OTC analgesics recommended     Neha FENG

## 2022-08-18 NOTE — NURSING NOTE
"Chief Complaint   Patient presents with     Sterilization     vasectomy       Blood pressure 124/81, pulse 80, height 1.905 m (6' 3\"), weight 86.2 kg (190 lb). Body mass index is 23.75 kg/m .    Patient Active Problem List   Diagnosis     CARDIOVASCULAR SCREENING; LDL GOAL LESS THAN 160       No Known Allergies    Current Outpatient Medications   Medication Sig Dispense Refill     IBUPROFEN PO Take 400 mg by mouth as needed for moderate pain (Patient not taking: Reported on 2022)       loratadine (CLARITIN) 10 MG tablet Take 10 mg by mouth daily (Patient not taking: Reported on 2022)         Social History     Tobacco Use     Smoking status: Former Smoker     Packs/day: 0.00     Years: 10.00     Pack years: 0.00     Types: Cigarettes     Start date: 1991     Quit date: 2001     Years since quittin.6     Smokeless tobacco: Never Used   Substance Use Topics     Alcohol use: Yes     Comment: rarely     Drug use: No       Invasive Procedure Safety Checklist:    Procedure: Bilateral Vasectomy    Action: Complete sections and checkboxes as appropriate.    Pre-procedure:  1. Patient ID Verified with 2 identifiers (Mattie and  or MRN) : YES    2. Procedure and site verified with patient/designee (when able) : YES    3. Accurate consent documentation in medical record : YES    4. H&P (or appropriate assessment) documented in medical record : N/A  H&P must be up to 30 days prior to procedure an updated within 24 hours of                 Procedure as applicable.     5. Relevant diagnostic and radiology test results appropriately labeled and displayed as applicable : YES    6. Blood products, implants, devices, and/or special equipment available for the procedure as applicable : YES    7. Procedure site(s) marked with provider initials [Exclusions: none] : NO    8. Marking not required. Reason : Yes  Procedure does not require site marking    Time Out:     Time-Out performed immediately prior to starting " procedure, including verbal and active participation of all team members addressing: YES    1. Correct patient identity.  2. Confirmed that the correct side and site are marked.  3. An accurate procedure to be done.  4. Agreement on the procedure to be done.  5. Correct patient position.  6. Relevant images and results are properly labeled and appropriately displayed.  7. The need to administer antibiotics or fluids for irrigation purposes during the procedure as applicable.  8. Safety precautions based on patient history or medication use.    During Procedure: Verification of correct person, site, and procedure occurs any time the responsibility for care of the patient is transferred to another member of the care team.    The following medication was given:     MEDICATION:  Lidocaine without epinephrine 2%  ROUTE: administered by physician - scrotal   SITE: administered by physician - scrotal   DOSE: 5 mL  LOT #: [id number]  : [email or web address]  EXPIRATION DATE: 03/26  NDC#: 65427-915-49   Was there drug waste? Yes  Amount of drug waste (mL): 15 mL.  Reason for waste:  Single use vial    Prior to injection, verified patient identity using patient's name and date of birth.  Due to injection administration, patient instructed to remain in clinic for 15 minutes  afterwards, and to report any adverse reaction to me immediately.    Drug Amount Wasted:  Yes: 15 mL (20 mg/ml)  Vial/Syringe: Single dose vial      ARY Rader  8/18/2022  2:01 PM

## 2022-09-11 ENCOUNTER — HEALTH MAINTENANCE LETTER (OUTPATIENT)
Age: 47
End: 2022-09-11

## 2022-10-27 ENCOUNTER — LAB (OUTPATIENT)
Dept: LAB | Facility: CLINIC | Age: 47
End: 2022-10-27
Payer: COMMERCIAL

## 2022-10-27 DIAGNOSIS — Z30.09 ENCOUNTER FOR VASECTOMY COUNSELING: ICD-10-CM

## 2022-10-27 PROCEDURE — 89321 SEMEN ANAL SPERM DETECTION: CPT

## 2022-10-28 DIAGNOSIS — Z98.52 S/P VASECTOMY: Primary | ICD-10-CM

## 2022-10-28 LAB
ABSTINENCE DAYS: 7 DAYS (ref 2–7)
AGGLUTINATION: NO
ANALYSIS TEMP - CENTIGRADE: 23 CENTIGRADE
COLLECTION METHOD: ABNORMAL
COLLECTION SITE: ABNORMAL
CONSENT TO RELEASE TO PARTNER: YES
DAL- RECEIVED TIME: ABNORMAL
LIQUEFIED: YES
ROUND CELLS: 0 MILLION/ML
SPECIMEN PH: 7 PH
SPECIMEN VOLUME: 3 ML
SPERM CONCENTRATION: 0 MILLION/ML
TIME OF ANALYSIS: ABNORMAL
TOTAL SPERM NUMBER: 0 MILLION
VISCOUS: NO

## 2022-10-28 NOTE — RESULT ENCOUNTER NOTE
Dear Sunny     Your post-vasectomy semen analysis shows very rare presence of a few non-motile (dead) sperm.    This sample meets current criteria for success (under 100,000 non-motile sperm).    The chance of pregnancy for men with this result is the same as if zero sperm were seen (about 1 in 2000) so you're OK for unprotected intercourse if you are OK with these risks.    If you want to be extra careful we can have you repeat a semen analysis in a month, to confirm results.  You can call [facility] [phone] to schedule at your convenience.    Thank You  Let me know if you have any questions.    Neha FENG

## 2023-04-30 ENCOUNTER — HEALTH MAINTENANCE LETTER (OUTPATIENT)
Age: 48
End: 2023-04-30

## 2023-05-31 ASSESSMENT — ENCOUNTER SYMPTOMS
HEMATURIA: 0
EYE PAIN: 0
JOINT SWELLING: 0
DYSURIA: 0
NERVOUS/ANXIOUS: 0
ABDOMINAL PAIN: 0
MYALGIAS: 0
DIZZINESS: 0
FREQUENCY: 0
CHILLS: 0
WEAKNESS: 0
HEARTBURN: 0
SORE THROAT: 0
SHORTNESS OF BREATH: 0
FEVER: 0
PARESTHESIAS: 0
HEADACHES: 0
COUGH: 0
CONSTIPATION: 0
PALPITATIONS: 0
DIARRHEA: 0
HEMATOCHEZIA: 0
NAUSEA: 0
ARTHRALGIAS: 1

## 2023-06-01 ENCOUNTER — OFFICE VISIT (OUTPATIENT)
Dept: FAMILY MEDICINE | Facility: CLINIC | Age: 48
End: 2023-06-01
Payer: COMMERCIAL

## 2023-06-01 VITALS
DIASTOLIC BLOOD PRESSURE: 74 MMHG | HEIGHT: 76 IN | OXYGEN SATURATION: 99 % | WEIGHT: 199 LBS | RESPIRATION RATE: 18 BRPM | BODY MASS INDEX: 24.23 KG/M2 | SYSTOLIC BLOOD PRESSURE: 137 MMHG | TEMPERATURE: 97.2 F | HEART RATE: 54 BPM

## 2023-06-01 DIAGNOSIS — Z00.00 ROUTINE ADULT HEALTH MAINTENANCE: Primary | ICD-10-CM

## 2023-06-01 DIAGNOSIS — E03.8 SUBCLINICAL HYPOTHYROIDISM: ICD-10-CM

## 2023-06-01 DIAGNOSIS — Z23 ENCOUNTER FOR IMMUNIZATION: ICD-10-CM

## 2023-06-01 LAB
ANION GAP SERPL CALCULATED.3IONS-SCNC: 11 MMOL/L (ref 7–15)
BUN SERPL-MCNC: 17.7 MG/DL (ref 6–20)
CALCIUM SERPL-MCNC: 9.4 MG/DL (ref 8.6–10)
CHLORIDE SERPL-SCNC: 106 MMOL/L (ref 98–107)
CHOLEST SERPL-MCNC: 156 MG/DL
CREAT SERPL-MCNC: 1.18 MG/DL (ref 0.67–1.17)
DEPRECATED HCO3 PLAS-SCNC: 24 MMOL/L (ref 22–29)
ERYTHROCYTE [DISTWIDTH] IN BLOOD BY AUTOMATED COUNT: 12.6 % (ref 10–15)
GFR SERPL CREATININE-BSD FRML MDRD: 75 ML/MIN/1.73M2
GLUCOSE SERPL-MCNC: 89 MG/DL (ref 70–99)
HCT VFR BLD AUTO: 42.6 % (ref 40–53)
HDLC SERPL-MCNC: 45 MG/DL
HGB BLD-MCNC: 14.9 G/DL (ref 13.3–17.7)
LDLC SERPL CALC-MCNC: 95 MG/DL
MCH RBC QN AUTO: 30.6 PG (ref 26.5–33)
MCHC RBC AUTO-ENTMCNC: 35 G/DL (ref 31.5–36.5)
MCV RBC AUTO: 88 FL (ref 78–100)
NONHDLC SERPL-MCNC: 111 MG/DL
PLATELET # BLD AUTO: 191 10E3/UL (ref 150–450)
POTASSIUM SERPL-SCNC: 4 MMOL/L (ref 3.4–5.3)
RBC # BLD AUTO: 4.87 10E6/UL (ref 4.4–5.9)
SODIUM SERPL-SCNC: 141 MMOL/L (ref 136–145)
TRIGL SERPL-MCNC: 78 MG/DL
TSH SERPL DL<=0.005 MIU/L-ACNC: 4.01 UIU/ML (ref 0.3–4.2)
WBC # BLD AUTO: 5.8 10E3/UL (ref 4–11)

## 2023-06-01 PROCEDURE — 80048 BASIC METABOLIC PNL TOTAL CA: CPT | Performed by: PHYSICIAN ASSISTANT

## 2023-06-01 PROCEDURE — 85027 COMPLETE CBC AUTOMATED: CPT | Performed by: PHYSICIAN ASSISTANT

## 2023-06-01 PROCEDURE — 90750 HZV VACC RECOMBINANT IM: CPT | Performed by: PHYSICIAN ASSISTANT

## 2023-06-01 PROCEDURE — 36415 COLL VENOUS BLD VENIPUNCTURE: CPT | Performed by: PHYSICIAN ASSISTANT

## 2023-06-01 PROCEDURE — 84443 ASSAY THYROID STIM HORMONE: CPT | Performed by: PHYSICIAN ASSISTANT

## 2023-06-01 PROCEDURE — 90471 IMMUNIZATION ADMIN: CPT | Performed by: PHYSICIAN ASSISTANT

## 2023-06-01 PROCEDURE — 80061 LIPID PANEL: CPT | Performed by: PHYSICIAN ASSISTANT

## 2023-06-01 PROCEDURE — 99396 PREV VISIT EST AGE 40-64: CPT | Mod: 25 | Performed by: PHYSICIAN ASSISTANT

## 2023-06-01 ASSESSMENT — ENCOUNTER SYMPTOMS
HEADACHES: 0
CHILLS: 0
COUGH: 0
HEMATOCHEZIA: 0
DYSURIA: 0
ARTHRALGIAS: 1
HEARTBURN: 0
PALPITATIONS: 0
MYALGIAS: 0
DIARRHEA: 0
CONSTIPATION: 0
SHORTNESS OF BREATH: 0
FEVER: 0
FREQUENCY: 0
NERVOUS/ANXIOUS: 0
JOINT SWELLING: 0
NAUSEA: 0
HEMATURIA: 0
EYE PAIN: 0
WEAKNESS: 0
PARESTHESIAS: 0
DIZZINESS: 0
SORE THROAT: 0
ABDOMINAL PAIN: 0

## 2023-06-01 NOTE — NURSING NOTE
Prior to immunization administration, verified patients identity using patient s name and date of birth. Please see Immunization Activity for additional information.     Screening Questionnaire for Adult Immunization    Are you sick today?   No   Do you have allergies to medications, food, a vaccine component or latex?   No   Have you ever had a serious reaction after receiving a vaccination?   No   Do you have a long-term health problem with heart, lung, kidney, or metabolic disease (e.g., diabetes), asthma, a blood disorder, no spleen, complement component deficiency, a cochlear implant, or a spinal fluid leak?  Are you on long-term aspirin therapy?   No   Do you have cancer, leukemia, HIV/AIDS, or any other immune system problem?   No   Do you have a parent, brother, or sister with an immune system problem?   No   In the past 3 months, have you taken medications that affect  your immune system, such as prednisone, other steroids, or anticancer drugs; drugs for the treatment of rheumatoid arthritis, Crohn s disease, or psoriasis; or have you had radiation treatments?   No   Have you had a seizure, or a brain or other nervous system problem?   No   During the past year, have you received a transfusion of blood or blood    products, or been given immune (gamma) globulin or antiviral drug?   No   For women: Are you pregnant or is there a chance you could become       pregnant during the next month?   No   Have you received any vaccinations in the past 4 weeks?   No     Immunization questionnaire answers were all negative.      Injection of shingrix given by Claudia Desai MA. Patient instructed to remain in clinic for 15 minutes afterwards, and to report any adverse reactions.     Screening performed by Claudia Desai MA on 6/1/2023 at 9:53 AM.

## 2023-06-01 NOTE — PROGRESS NOTES
SUBJECTIVE:   CC: Sunny is an 48 year old who presents for preventative health visit.       2023     9:14 AM   Additional Questions   Roomed by ana   Accompanied by self     Healthy Habits:     Getting at least 3 servings of Calcium per day:  Yes    Bi-annual eye exam:  Yes    Dental care twice a year:  Yes    Sleep apnea or symptoms of sleep apnea:  None    Diet:  Regular (no restrictions) and Gluten-free/reduced    Frequency of exercise:  4-5 days/week    Duration of exercise:  45-60 minutes    Taking medications regularly:  Yes    Medication side effects:  Not applicable    PHQ-2 Total Score: 0    Additional concerns today:  No    Sunny here today for R[facility] visit  No particular concerns    Today's PHQ-2 Score:       2023     8:14 AM   PHQ-2 (  [employer])   Q1: Little interest or pleasure in doing things 0   Q2: Feeling down, depressed or hopeless 0   PHQ-2 Score 0   Q1: Little interest or pleasure in doing things Not at all   Q2: Feeling down, depressed or hopeless Not at all   PHQ-2 Score 0     Have you ever done Advance Care Planning? (For example, a Health Directive, POLST, or a discussion with a medical provider or your loved ones about your wishes): No, advance care planning information given to patient to review.  Patient declined advance care planning discussion at this time.    Social History     Tobacco Use     Smoking status: Former     Packs/day: 0.50     Years: 10.00     Pack years: 5.00     Types: Cigarettes     Start date: 1991     Quit date: 2001     Years since quittin.4     Smokeless tobacco: Never   Vaping Use     Vaping status: Never Used   Substance Use Topics     Alcohol use: Yes     Comment: rarely           2023     8:13 AM   Alcohol Use   Prescreen: >3 drinks/day or >7 drinks/week? No          View : No data to display.              Last PSA:   Prostate Specific Antigen Screen   Date Value Ref Range Status   2022 0.57 0.00 - 4.00 ug/L Final     Reviewed orders  "with patient. Reviewed health maintenance and updated orders accordingly - Yes    Reviewed and updated as needed this visit by clinical staff   Tobacco  Allergies  Meds  Problems  Med Hx  Surg Hx  Fam Hx          Reviewed and updated as needed this visit by Provider   Tobacco   Meds  Problems  Med Hx  Surg Hx  Fam Hx           Review of Systems   Constitutional: Negative for chills and fever.   HENT: Negative for congestion, ear pain, hearing loss and sore throat.    Eyes: Negative for pain and visual disturbance.   Respiratory: Negative for cough and shortness of breath.    Cardiovascular: Negative for chest pain, palpitations and peripheral edema.   Gastrointestinal: Negative for abdominal pain, constipation, diarrhea, heartburn, hematochezia and nausea.   Genitourinary: Negative for dysuria, frequency, genital sores, hematuria, impotence, penile discharge and urgency.   Musculoskeletal: Positive for arthralgias. Negative for joint swelling and myalgias.   Skin: Negative for rash.   Neurological: Negative for dizziness, weakness, headaches and paresthesias.   Psychiatric/Behavioral: Negative for mood changes. The patient is not nervous/anxious.        OBJECTIVE:   /74 (BP Location: Right arm, Patient Position: Sitting, Cuff Size: Adult Regular)   Pulse 54   Temp 97.2  F (36.2  C) (Temporal)   Resp 18   Ht 1.924 m (6' 3.75\")   Wt 90.3 kg (199 lb)   SpO2 99%   BMI 24.38 kg/m      Physical Exam  GENERAL: healthy, alert and no distress  EYES: Eyes grossly normal to inspection, PERRL and conjunctivae and sclerae normal  HENT: ear canals and TM's normal, nose and mouth without ulcers or lesions  NECK: no adenopathy, no asymmetry, masses, or scars and thyroid normal to palpation  RESP: lungs clear to auscultation - no rales, rhonchi or wheezes  CV: regular rate and rhythm, normal S1 S2, no S3 or S4, no murmur, click or rub, no peripheral edema and peripheral pulses strong  ABDOMEN: soft, " nontender, no hepatosplenomegaly, no masses and bowel sounds normal  MS: no gross musculoskeletal defects noted, no edema  SKIN: no suspicious lesions or rashes  NEURO: Normal strength and tone, mentation intact and speech normal  PSYCH: mentation appears normal, affect normal/bright      ASSESSMENT/PLAN:   Sunny was seen today for physical.    Diagnoses and all orders for this visit:    Routine adult health maintenance - discussed PSA, he will do every other year with executive physical.  -     CBC with platelets; Future  -     Basic metabolic panel; Future  -     Lipid panel reflex to direct LDL Non-fasting; Future  -     REVIEW OF HEALTH MAINTENANCE PROTOCOL ORDERS    Subclinical hypothyroidism - due for monitoring, await results. Denies any symptoms.  -     TSH; Future    Encounter for immunization  -     ZOSTER VACCINE RECOMBINANT ADJUVANTED (SHINGRIX)    COUNSELING:   Reviewed preventive health counseling, as reflected in patient instructions      He reports that he quit smoking about 22 years ago. His smoking use included cigarettes. He started smoking about 32 years ago. He has a 5.00 pack-year smoking history. He has never used smokeless tobacco.            Maryann Vail PA-C  [facility]

## 2023-10-24 ENCOUNTER — IMMUNIZATION (OUTPATIENT)
Dept: NURSING | Facility: CLINIC | Age: 48
End: 2023-10-24
Payer: COMMERCIAL

## 2023-10-24 PROCEDURE — 91320 SARSCV2 VAC 30MCG TRS-SUC IM: CPT

## 2023-10-24 PROCEDURE — 90480 ADMN SARSCOV2 VAC 1/ONLY CMP: CPT

## 2024-06-05 ENCOUNTER — TELEPHONE (OUTPATIENT)
Dept: DERMATOLOGY | Facility: CLINIC | Age: 49
End: 2024-06-05
Payer: COMMERCIAL

## 2024-07-13 ENCOUNTER — HEALTH MAINTENANCE LETTER (OUTPATIENT)
Age: 49
End: 2024-07-13

## 2024-08-27 ASSESSMENT — ANXIETY QUESTIONNAIRES
GAD7 TOTAL SCORE: 0
7. FEELING AFRAID AS IF SOMETHING AWFUL MIGHT HAPPEN: NOT AT ALL
7. FEELING AFRAID AS IF SOMETHING AWFUL MIGHT HAPPEN: NOT AT ALL
GAD7 TOTAL SCORE: 0

## 2024-09-04 ENCOUNTER — VIRTUAL VISIT (OUTPATIENT)
Dept: INTERNAL MEDICINE | Facility: CLINIC | Age: 49
End: 2024-09-04
Payer: COMMERCIAL

## 2024-09-04 DIAGNOSIS — Z00.00 ENCOUNTER FOR PREVENTIVE HEALTH EXAMINATION: Primary | ICD-10-CM

## 2024-09-04 DIAGNOSIS — Z00.00 VISIT FOR PREVENTIVE HEALTH EXAMINATION: ICD-10-CM

## 2024-09-04 PROCEDURE — 99207 PR NO CHARGE LOS: CPT | Mod: 93

## 2024-09-04 RX ORDER — LORATADINE 10 MG/1
10 TABLET ORAL DAILY
COMMUNITY

## 2024-09-04 RX ORDER — IBUPROFEN 200 MG
200 TABLET ORAL EVERY 4 HOURS PRN
COMMUNITY

## 2024-09-04 NOTE — PROGRESS NOTES
Health Maintenance:  Do you have a PCP? No  When was your last visit with your PCP?   When was your last eye exam? 2/9/22  Have you ever had a colonoscopy? Yes   If yes, when? 2022  Have you ever had any polyps removed? No      As part of your visit we will set up a DEXA scan which will measure your body composition. We have a few questions that need to be answered before we can schedule this scan:   What is your approximate weight? 188   Have you ever had a DEXA scan within the past 2 years? No   Will you have any other imaging studies with contrast (x-ray, CT scan) within 7 days of this appointment? No   Have you had any spine or hip surgery? No   Do you take any vitamins that contain calcium or antacids with calcium? Yes    If yes, stop taking 24 hours prior to visit.     Goals for the Visit:  Thorough Comprehensive Preventive Exam  Derm Concerns  Immune concerns    Pertinent past Medical/Family and Social HX:   Pertinent sx that desire are addressed with this visit:     Answers submitted by the patient for this visit:  Patient Health Questionnaire (G7) (Submitted on 8/27/2024)  MICHAEL 7 TOTAL SCORE: 0      Instructions prior to appointment:   1. Fast beginning at 10 pm for lab appointment  2. If your preventive care assessment package includes a Fitness Assessment, please bring athletic shoes. Complementary [facility] fitness attire is provided and yours to keep.  3. If eye exam, eyes may be dilated, it will last 4-6 hours, may want to bring sunglasses.   4. May bring laptop or other work materials for use during downtime.   5. You will receive an email about 3 days prior to your visit with a final itinerary, menu selections for the complementary breakfast and lunch and instructions for the visit.     Complimentary  Parking provided. Drop off car in front of [facility], take the patient elevators to the [address] [place] [address]. When you enter in the lobby,  identify yourself as an [facility] [atient and you will be escorted up to the clinic.   If questions arise prior to your appointment please contact the clinic at [phone].

## 2024-09-05 ENCOUNTER — OFFICE VISIT (OUTPATIENT)
Dept: PULMONOLOGY | Facility: CLINIC | Age: 49
End: 2024-09-05
Payer: COMMERCIAL

## 2024-09-05 ENCOUNTER — OFFICE VISIT (OUTPATIENT)
Dept: INTERNAL MEDICINE | Facility: CLINIC | Age: 49
End: 2024-09-05
Payer: COMMERCIAL

## 2024-09-05 ENCOUNTER — OFFICE VISIT (OUTPATIENT)
Dept: OPHTHALMOLOGY | Facility: CLINIC | Age: 49
End: 2024-09-05
Payer: COMMERCIAL

## 2024-09-05 ENCOUNTER — OFFICE VISIT (OUTPATIENT)
Dept: AUDIOLOGY | Facility: CLINIC | Age: 49
End: 2024-09-05
Payer: COMMERCIAL

## 2024-09-05 ENCOUNTER — OFFICE VISIT (OUTPATIENT)
Dept: DERMATOLOGY | Facility: CLINIC | Age: 49
End: 2024-09-05
Payer: COMMERCIAL

## 2024-09-05 ENCOUNTER — ANCILLARY PROCEDURE (OUTPATIENT)
Dept: BONE DENSITY | Facility: CLINIC | Age: 49
End: 2024-09-05
Payer: COMMERCIAL

## 2024-09-05 ENCOUNTER — OFFICE VISIT (OUTPATIENT)
Dept: GASTROENTEROLOGY | Facility: CLINIC | Age: 49
End: 2024-09-05
Payer: COMMERCIAL

## 2024-09-05 ENCOUNTER — APPOINTMENT (OUTPATIENT)
Dept: INTERNAL MEDICINE | Facility: CLINIC | Age: 49
End: 2024-09-05
Payer: COMMERCIAL

## 2024-09-05 VITALS
SYSTOLIC BLOOD PRESSURE: 113 MMHG | OXYGEN SATURATION: 99 % | BODY MASS INDEX: 23.62 KG/M2 | HEART RATE: 53 BPM | TEMPERATURE: 97.6 F | HEIGHT: 75 IN | WEIGHT: 190 LBS | RESPIRATION RATE: 16 BRPM | DIASTOLIC BLOOD PRESSURE: 72 MMHG

## 2024-09-05 DIAGNOSIS — R73.01 IMPAIRED FASTING GLUCOSE: ICD-10-CM

## 2024-09-05 DIAGNOSIS — H52.13 MYOPIA OF BOTH EYES: Primary | ICD-10-CM

## 2024-09-05 DIAGNOSIS — Z00.00 ENCOUNTER FOR PREVENTIVE HEALTH EXAMINATION: ICD-10-CM

## 2024-09-05 DIAGNOSIS — E03.8 SUBCLINICAL HYPOTHYROIDISM: ICD-10-CM

## 2024-09-05 DIAGNOSIS — Z00.00 VISIT FOR PREVENTIVE HEALTH EXAMINATION: ICD-10-CM

## 2024-09-05 DIAGNOSIS — D22.9 MULTIPLE MELANOCYTIC NEVI: ICD-10-CM

## 2024-09-05 DIAGNOSIS — Z71.82 EXERCISE COUNSELING: Primary | ICD-10-CM

## 2024-09-05 DIAGNOSIS — H02.88A MEIBOMIAN GLAND DYSFUNCTION (MGD) OF UPPER AND LOWER LIDS OF BOTH EYES: ICD-10-CM

## 2024-09-05 DIAGNOSIS — H90.42 SENSORINEURAL HEARING LOSS (SNHL) OF LEFT EAR WITH UNRESTRICTED HEARING OF RIGHT EAR: ICD-10-CM

## 2024-09-05 DIAGNOSIS — M85.9 LOW BONE DENSITY: ICD-10-CM

## 2024-09-05 DIAGNOSIS — Z12.83 SKIN CANCER SCREENING: ICD-10-CM

## 2024-09-05 DIAGNOSIS — R79.89 ELEVATED SERUM CREATININE: ICD-10-CM

## 2024-09-05 DIAGNOSIS — Z00.00 VISIT FOR PREVENTIVE HEALTH EXAMINATION: Primary | ICD-10-CM

## 2024-09-05 DIAGNOSIS — H02.88B MEIBOMIAN GLAND DYSFUNCTION (MGD) OF UPPER AND LOWER LIDS OF BOTH EYES: ICD-10-CM

## 2024-09-05 DIAGNOSIS — L30.9 DERMATITIS: Primary | ICD-10-CM

## 2024-09-05 DIAGNOSIS — H90.42 SENSORINEURAL HEARING LOSS (SNHL) OF LEFT EAR WITH UNRESTRICTED HEARING OF RIGHT EAR: Primary | ICD-10-CM

## 2024-09-05 DIAGNOSIS — Z00.00 ENCOUNTER FOR PREVENTIVE HEALTH EXAMINATION: Primary | ICD-10-CM

## 2024-09-05 LAB
ALBUMIN UR-MCNC: NEGATIVE MG/DL
ALP SERPL-CCNC: 71 U/L (ref 40–150)
ALT SERPL W P-5'-P-CCNC: 25 U/L (ref 0–70)
APPEARANCE UR: CLEAR
BASOPHILS # BLD AUTO: 0.1 10E3/UL (ref 0–0.2)
BASOPHILS NFR BLD AUTO: 1 %
BILIRUB UR QL STRIP: NEGATIVE
CALCIUM SERPL-MCNC: 9.4 MG/DL (ref 8.8–10.4)
CHOLEST SERPL-MCNC: 167 MG/DL
COLOR UR AUTO: ABNORMAL
CREAT SERPL-MCNC: 1.21 MG/DL (ref 0.67–1.17)
CREAT UR-MCNC: 45.7 MG/DL
CYSTATIN C (ROCHE): 1.1 MG/L (ref 0.6–1)
EGFRCR SERPLBLD CKD-EPI 2021: 72 ML/MIN/1.73M2
EOSINOPHIL # BLD AUTO: 0.1 10E3/UL (ref 0–0.7)
EOSINOPHIL NFR BLD AUTO: 2 %
ERYTHROCYTE [DISTWIDTH] IN BLOOD BY AUTOMATED COUNT: 13 % (ref 10–15)
FASTING STATUS PATIENT QL REPORTED: YES
FASTING STATUS PATIENT QL REPORTED: YES
GFR/BSA.PRED SERPLBLD CYS-BASED-ARV: 71 ML/MIN/1.73M2
GLUCOSE SERPL-MCNC: 104 MG/DL (ref 70–99)
GLUCOSE UR STRIP-MCNC: NEGATIVE MG/DL
HCT VFR BLD AUTO: 44.1 % (ref 40–53)
HDLC SERPL-MCNC: 52 MG/DL
HGB BLD-MCNC: 15.5 G/DL (ref 13.3–17.7)
HGB UR QL STRIP: NEGATIVE
HIV 1+2 AB+HIV1 P24 AG SERPL QL IA: NONREACTIVE
HOLD SPECIMEN: NORMAL
IMM GRANULOCYTES # BLD: 0 10E3/UL
IMM GRANULOCYTES NFR BLD: 1 %
KETONES UR STRIP-MCNC: NEGATIVE MG/DL
LDLC SERPL CALC-MCNC: 99 MG/DL
LEUKOCYTE ESTERASE UR QL STRIP: NEGATIVE
LYMPHOCYTES # BLD AUTO: 1.8 10E3/UL (ref 0.8–5.3)
LYMPHOCYTES NFR BLD AUTO: 30 %
MCH RBC QN AUTO: 31.1 PG (ref 26.5–33)
MCHC RBC AUTO-ENTMCNC: 35.1 G/DL (ref 31.5–36.5)
MCV RBC AUTO: 88 FL (ref 78–100)
MICROALBUMIN UR-MCNC: <12 MG/L
MICROALBUMIN/CREAT UR: NORMAL MG/G{CREAT}
MONOCYTES # BLD AUTO: 0.7 10E3/UL (ref 0–1.3)
MONOCYTES NFR BLD AUTO: 11 %
NEUTROPHILS # BLD AUTO: 3.2 10E3/UL (ref 1.6–8.3)
NEUTROPHILS NFR BLD AUTO: 55 %
NITRATE UR QL: NEGATIVE
NONHDLC SERPL-MCNC: 115 MG/DL
NRBC # BLD AUTO: 0 10E3/UL
NRBC BLD AUTO-RTO: 0 /100
PH UR STRIP: 5 [PH] (ref 5–7)
PHOSPHATE SERPL-MCNC: 2.4 MG/DL (ref 2.5–4.5)
PLATELET # BLD AUTO: 174 10E3/UL (ref 150–450)
PSA SERPL DL<=0.01 NG/ML-MCNC: 0.57 NG/ML (ref 0–3.5)
RBC # BLD AUTO: 4.99 10E6/UL (ref 4.4–5.9)
RBC URINE: <1 /HPF
SP GR UR STRIP: 1 (ref 1–1.03)
TRIGL SERPL-MCNC: 78 MG/DL
TSH SERPL DL<=0.005 MIU/L-ACNC: 5.58 UIU/ML (ref 0.3–4.2)
UROBILINOGEN UR STRIP-MCNC: NORMAL MG/DL
VIT D+METAB SERPL-MCNC: 25 NG/ML (ref 20–50)
WBC # BLD AUTO: 5.8 10E3/UL (ref 4–11)
WBC URINE: 0 /HPF

## 2024-09-05 PROCEDURE — 92557 COMPREHENSIVE HEARING TEST: CPT | Performed by: AUDIOLOGIST

## 2024-09-05 PROCEDURE — 99396 PREV VISIT EST AGE 40-64: CPT | Performed by: INTERNAL MEDICINE

## 2024-09-05 PROCEDURE — G0103 PSA SCREENING: HCPCS | Performed by: PATHOLOGY

## 2024-09-05 PROCEDURE — 84403 ASSAY OF TOTAL TESTOSTERONE: CPT | Performed by: INTERNAL MEDICINE

## 2024-09-05 PROCEDURE — 87389 HIV-1 AG W/HIV-1&-2 AB AG IA: CPT | Performed by: INTERNAL MEDICINE

## 2024-09-05 PROCEDURE — 82043 UR ALBUMIN QUANTITATIVE: CPT | Performed by: INTERNAL MEDICINE

## 2024-09-05 PROCEDURE — 82610 CYSTATIN C: CPT | Performed by: INTERNAL MEDICINE

## 2024-09-05 PROCEDURE — 84443 ASSAY THYROID STIM HORMONE: CPT | Performed by: PATHOLOGY

## 2024-09-05 PROCEDURE — 82947 ASSAY GLUCOSE BLOOD QUANT: CPT | Performed by: PATHOLOGY

## 2024-09-05 PROCEDURE — 99207 PR NO BILLABLE SERVICE THIS VISIT: CPT | Performed by: DIETITIAN, REGISTERED

## 2024-09-05 PROCEDURE — 99207 PR NO CHARGE LOS: CPT

## 2024-09-05 PROCEDURE — 92015 DETERMINE REFRACTIVE STATE: CPT | Performed by: OPTOMETRIST

## 2024-09-05 PROCEDURE — 99213 OFFICE O/P EST LOW 20 MIN: CPT | Performed by: DERMATOLOGY

## 2024-09-05 PROCEDURE — 94375 RESPIRATORY FLOW VOLUME LOOP: CPT | Performed by: INTERNAL MEDICINE

## 2024-09-05 PROCEDURE — 81001 URINALYSIS AUTO W/SCOPE: CPT | Performed by: PATHOLOGY

## 2024-09-05 PROCEDURE — 80061 LIPID PANEL: CPT | Performed by: PATHOLOGY

## 2024-09-05 PROCEDURE — 84460 ALANINE AMINO (ALT) (SGPT): CPT | Performed by: PATHOLOGY

## 2024-09-05 PROCEDURE — 92014 COMPRE OPH EXAM EST PT 1/>: CPT | Performed by: OPTOMETRIST

## 2024-09-05 PROCEDURE — 82565 ASSAY OF CREATININE: CPT | Performed by: PATHOLOGY

## 2024-09-05 PROCEDURE — 84075 ASSAY ALKALINE PHOSPHATASE: CPT | Performed by: PATHOLOGY

## 2024-09-05 PROCEDURE — 77080 DXA BONE DENSITY AXIAL: CPT | Performed by: INTERNAL MEDICINE

## 2024-09-05 PROCEDURE — 36415 COLL VENOUS BLD VENIPUNCTURE: CPT | Performed by: PATHOLOGY

## 2024-09-05 PROCEDURE — 99000 SPECIMEN HANDLING OFFICE-LAB: CPT | Performed by: PATHOLOGY

## 2024-09-05 PROCEDURE — 84100 ASSAY OF PHOSPHORUS: CPT | Performed by: PATHOLOGY

## 2024-09-05 PROCEDURE — 82310 ASSAY OF CALCIUM: CPT | Performed by: PATHOLOGY

## 2024-09-05 PROCEDURE — 85025 COMPLETE CBC W/AUTO DIFF WBC: CPT | Performed by: PATHOLOGY

## 2024-09-05 PROCEDURE — 82306 VITAMIN D 25 HYDROXY: CPT | Performed by: INTERNAL MEDICINE

## 2024-09-05 PROCEDURE — 92567 TYMPANOMETRY: CPT | Performed by: AUDIOLOGIST

## 2024-09-05 RX ORDER — MOMETASONE FUROATE 1 MG/G
OINTMENT TOPICAL DAILY
Qty: 15 G | Refills: 1 | Status: SHIPPED | OUTPATIENT
Start: 2024-09-05

## 2024-09-05 ASSESSMENT — EXTERNAL EXAM - LEFT EYE: OS_EXAM: NORMAL

## 2024-09-05 ASSESSMENT — CONF VISUAL FIELD
OD_INFERIOR_NASAL_RESTRICTION: 0
OD_INFERIOR_TEMPORAL_RESTRICTION: 0
OD_NORMAL: 1
OS_NORMAL: 1
OD_SUPERIOR_TEMPORAL_RESTRICTION: 0
OS_SUPERIOR_TEMPORAL_RESTRICTION: 0
METHOD: COUNTING FINGERS
OS_SUPERIOR_NASAL_RESTRICTION: 0
OS_INFERIOR_TEMPORAL_RESTRICTION: 0
OS_INFERIOR_NASAL_RESTRICTION: 0
OD_SUPERIOR_NASAL_RESTRICTION: 0

## 2024-09-05 ASSESSMENT — REFRACTION_MANIFEST
OS_ADD: +2.00
OS_AXIS: 044
OS_CYLINDER: +0.25
OD_ADD: +2.00
OD_CYLINDER: +0.50
OD_SPHERE: -0.75
OS_SPHERE: -0.50
OD_AXIS: 120

## 2024-09-05 ASSESSMENT — VISUAL ACUITY
METHOD: SNELLEN - LINEAR
CORRECTION_TYPE: GLASSES
OS_CC: J2
OD_CC+: -1
OS_CC: 20/30
OD_CC: J1
OD_CC: 20/20

## 2024-09-05 ASSESSMENT — SLIT LAMP EXAM - LIDS
COMMENTS: 1+ MGD
COMMENTS: 1+ MGD

## 2024-09-05 ASSESSMENT — PAIN SCALES - GENERAL
PAINLEVEL: NO PAIN (0)
PAINLEVEL: NO PAIN (0)

## 2024-09-05 ASSESSMENT — REFRACTION_WEARINGRX
OD_CYLINDER: +0.75
SPECS_TYPE: PAL
OS_CYLINDER: SPHERE
OS_ADD: +1.75
OD_ADD: +1.75
OD_SPHERE: -1.00
OD_AXIS: 133
OS_SPHERE: -1.00

## 2024-09-05 ASSESSMENT — CUP TO DISC RATIO
OS_RATIO: 0.3
OD_RATIO: 0.4

## 2024-09-05 ASSESSMENT — EXTERNAL EXAM - RIGHT EYE: OD_EXAM: NORMAL

## 2024-09-05 ASSESSMENT — TONOMETRY
OS_IOP_MMHG: 11
IOP_METHOD: ICARE
OD_IOP_MMHG: 13

## 2024-09-05 NOTE — LETTER
9/5/2024       RE: Sunny Pruitt  [address] [place] [address]     Dear Colleague,    Thank you for referring your patient, Sunny Pruitt, to the [facility] at [facility]. Please see a copy of my visit note below.     History and Physical Examination     SUBJECTIVE: Chief concern: preventive health review.     Past Medical History:  1.  Congenital pes cavus, status post right (2015 and left (2016) osteotomy procedures  2.  Status post right foot surgery to address calcaneal cuboid joint nonunion, 2016  3.  Right navicular cuneiform arthritis  4.  Status post left foot peroneal subluxation open reduction/internal fixation, 2019  5.  Status post right (2015) and left (2018) arthroscopic partial meniscectomies.  6.  Allergic rhinitis  7.  Status post hydrocelectomy, 12/2011  8.  History of maxillary sinusitis, 12/2021  9.  History of subclinical hypothyroidism  10.  History of benign paroxysmal positional vertigo, 2/2022  11.  Status post vasectomy, 8/2022     Adverse Drug Reactions: None.     Current Medications:  Daily multivitamin supplement  Ibuprofen, 200 mg as needed  Loratadine, 10 mg daily as needed, primarily during spring months.  Calcium plus vitamin D3 supplement, one tablet daily  Dimenhydrinate (Dramamine), as needed for motion sickness     Habits:  Tobacco: None since 2010; history of 5 pack-years (10 years ×1/2 pack per day) of previous use  Alcohol: 1 serving, 6 days per week  Caffeine: 2-3 servings of coffee per day     Social History:  to Ed Reis, an [place] native and executive at [employer].  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  In early , they moved back to [facility] for a 3-year assignment.  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  He enjoys outdoor activities, including cycling, cross-country skiing, and walking.  Currently, he exercises for 45 minutes per day, rotating workouts among free weights, stationary biking, and machine rowing, followed by stretching sessions.     Family History: Father is in good health at age 92.  Mother is 89, with history of dyslipidemia, skin cancer, and TIA.  A sister 1 year his senior is in good health, with history of malignant chordoma of the coccyx for which she underwent surgery in approximately .  A biological daughter for whose mother he was a sperm donor has scoliosis and unspecified arthritis at age 19.  Maternal grandmother  at age 87, with history of TIAs and dementia at advanced age.  Maternal grandfather  at age 74; details of health history not known.  Paternal grandmother  at advanced age.  Paternal grandfather  at age 84, with history of type 2 diabetes mellitus and dementia, diagnosed at age 82.     Review of Systems: Since moving to [facility] in early , Sunny has noted a 1-week history of influenza symptoms; a 3-day history of gastroenteritis symptoms; and a \"severe cold\"; he questions whether this pattern might suggest a compromised immune system.  He does state that his diet and activity levels changed dramatically at the time of his move, during which.  He noted significant stress; currently he denies significant stress and feels that his diet and exercise regimen have improved.  Longstanding difficulties with motion sickness, relieved with use of dimenhydrinate.  Following the multiple surgical procedures on " his feet, he has noted marked improvement of previous problems with pain and stiffness; he believes that symptoms fully resolved after he switched from wearing shoes to exclusively wearing sandals or flip-flops after moving to [facility].  Previously noted mild tinnitus has not recurred over recent months.  Colonoscopy was negative in 3/2022.  IPV was administered in 1/2012.  Most recent tetanus (TdAP) was administered in 1/2012; Td was administered in 2/2022.  Hepatitis A/Hepatitis B vaccination series was completed in 11/2018.  Typhoid IM was administered in 1/2012.  Covid (Pfizer) vaccinations were administered in 4/2021 (x 2), 12/2021, 10/2022, and 10/2023.  Recombinant zoster vaccination series was completed in 10/2023.  Remainder of complete review of systems was negative.     OBJECTIVE:     Vital signs: Height 74.5 inches.  Weight 190 pounds.  Blood pressure 114/72 on average of 3 automated readings.  Heart rate 53, respiratory rate 16.  Temperature 97.6 degrees.  O2 saturation 99% on room air.  General: Alert, neatly dressed and groomed, in no acute distress.  HEENT: Atraumatic and normocephalic. Eyelids, pupils, and conjunctivae appeared normal. Lips, teeth and gums appear normal.  Oropharynx showed moist mucous membranes, without exudate or erythema.  Neck: Supple, without thyromegaly, mass, or bruit. No cervical or supraclavicular lymphadenopathy.  Back: No spinal or costovertebral angle tenderness.  Chest: Clear to auscultation and percussion. Normal respiratory effort.  Cardiovascular: No jugular venous distention. Regular rate and rhythm, normal S1, S2 without murmur.  Abdomen: Bowel sounds positive; soft, nontender, without rebound, guarding, hepatosplenomegaly or mass.  Extremities: No cyanosis or edema.  Genitalia: Normal male genitalia, without scrotal mass or hernia. No inguinal lymphadenopathy.  Rectal: Examination was offered but declined.  Skin: Examination was deferred; full evaluation was  completed earlier in day through dermatology clinic.  Neurologic: Cranial nerves II-XII were grossly intact. Sensory and motor examinations were normal. Normal gait.  Mini-cog score was 5/5.    Psychiatric: Alert and oriented ×3. Normal affect. Judgment and insight intact.  PHQ-2 score was 0.  MICHAEL-7 score 0.     Creatinine 1.21 (0.67-1.17), Cystatin C 1.1 (0.6-1.0), GFR 72 (using creatinine) and 71 (using Cystatin C), alkaline phosphatase 71, ALT 25, cholesterol 167, triglycerides 78, HDL 52, LDL 99, cholesterol/HDL 3.2, glucose 104, PSA 0.57, TSH 5.58, 25-hydroxy vitamin D 25, white blood cell count 5800, hemoglobin 15.5, platelets 174,000, HIV nonreactive, urinalysis notable for specific gravity 1.000; no evidence of blood or protein.  Urine albumin/creatinine pending.     EKG was unremarkable.  Spirometry showed an FEV1 of 4.49, with an FVC of 5.89; readings were 108 % and 111% of predicted values, respectively.    An audiogram showed normal hearing on the right with normal sloping to mild sensorineural hearing loss rising to normal hearing on the left.  Thresholds were stable relative to 2/2022.     Preliminary DEXA results showed low bone density, with most negative and valid T-score of -1.6 at the level of the right and left femoral necks.    Body composition analysis showed 20.4% fat (26th percentile); body mass index was 24.1.  Reading from 2/2022 showed 20.5% fat (30th percentile).     ASSESSMENT:     1.  Impaired fasting glucose.  We discussed the implications of this diagnosis, along with the importance of additional exercise, maintenance of ideal weight, and adherence to a modified diet, the elements of which were reviewed in detail.  He was advised to arrange measurement of fasting glucose levels at least annually.     2.  Low bone density.  We reviewed the implications of this finding, along with the importance of regular weight-bearing exercise; increased vitamin D3 supplementation (I recommended that  he increase daily intake by 25 mcg), and daily calcium intake of 1200 mg, preferably dietary sources.  Additional tests were arranged to exclude underlying causes of bone loss; findings included a normal calcium level 9.4 and a minimally depressed phosphorus level 2.4 (normal range 2.5-4.5); testosterone and SPEP results are pending.  He was advised to submit a 24-hour urine specimen for evaluation of calcium excretion.  Further recommendations will be based on results of pending studies.     3.  Subclinical hypothyroidism.  Noted previously, with no active cold intolerance, fatigue, unexplained weight gain, or constipation.  We discussed the implications of this diagnosis and the importance of measuring a TSH level within 12 months.     4.  Asymmetric hearing loss.  Relatively minimal asymmetry with stable hearing thresholds relative to 2022.  In the event of more pronounced asymmetry, ENT consultation will be arranged.    5.  Elevated serum creatinine.  Minimal elevations of creatinine and Cystatin C, with acceptable GFR.  Urine albumin/creatinine results pending.  Sunny will be advised to avoid use of nonsteroidal inflammatory drugs including ibuprofen and naproxen; avoid dehydration, with use of fluid and electrolyte replacement in the event of substantial losses from sweat given [place]; and arrange repeat measurement of a creatinine level after approximately 3 months.    6.  Preventive care.  I recommended influenza and COVID vaccinations this fall, with a southern hemisphere influenza vaccination in approximately May, 2025 given current residence in [facility].  Using the PREVENT algorithm from the American Heart Association, his estimated 10-year/30-year risks are as follows: Cardiovascular disease, 2.4% / 16.6%; atherosclerotic cardiovascular disease, 1.5% / 9.9%; and heart failure, 1.0% / 8.1%.  We reviewed the thresholds at which statin and aspirin treatment are  recommended, along with the importance of maintaining ideal weight, continuing a regimen of regular exercise, and following a modified diet, the elements of which were reviewed in detail.  We reviewed the debate regarding the benefit of multivitamin supplements and the importance of selecting a product that does not contain iron should he choose to continue.  I recommended that he increase his sleep volume from his current level of 7 hours per night.  We discussed lifestyle measures that may facilitate cognitive health, including limiting consumption of alcohol; continuing a regimen of regular exercise; regularly engaging in social activities and correcting hearing loss if interfering with socialization; engaging in intellectually stimulating activities that require new learning; and the potential benefit of fisetin, found in highest concentration and strawberries.     PLAN: See above.     ~SRT      Again, thank you for allowing me to participate in the care of your patient.      Sincerely,    Derrick Aburto MD

## 2024-09-05 NOTE — PROGRESS NOTES
AUDIOLOGY REPORT  [facility] Assessment    SUMMARY: Audiology visit completed. See audiogram for results.      RECOMMENDATIONS: Follow-up with referring provider. Repeat audio in 2-3 years to monitor left hearing, sooner if concerns. Referral to ENT re: asymmetry may be warranted if asymmetry grows.      Frederick Leija  Audiologist  [place] License  #[id number]

## 2024-09-05 NOTE — LETTER
"9/5/2024      Sunny Pruitt  [address] [place] [address]      Dear Colleague,    Thank you for referring your patient, Sunny Pruitt, to the [facility]STRO[facility]OLOG[facility] [place]. Please see a copy of my visit note below.    [facility] [employer] [facility] Outpatient Medical Nutrition Therapy      Time Spent:  50 minutes  Session Type:  Initial   Referral Source: [employer] Package/Dr. Derrick Aburto  Reason for RD Visit:   Nutritional counseling     Nutrition Assessment:    Patient is a 49 y.o. male who is here for initial annual visit with Registered Dietitian (RD).    He stated that he and his wife moved to [facility] for his wife's 3-year work assignment. While living there they are mostly eating a vegetarian diet. They will eat some fish, eggs and dairy products. They lived there in [id number], and got sick at that time, so they decided to eat more of a vegetarian diet and so far they have been feeling well. They mostly make meals at home and only eat/take out about 1x/week. His wife has been bringing lunch meal from home as well. She has celiac disease so follows a gluten free diet. His wife joined the visit about jail through. They are interested in general healthful vegetarian diet education and tips.    Patient Active Problem List   Diagnosis     CARDIOVASCULAR SCREENING; LDL GOAL LESS THAN 160     Estimated body mass index is 24.07 kg/m  as calculated from the following:    Height as of an earlier encounter on 9/5/24: 1.892 m (6' 2.5\").    Weight as of an earlier encounter on 9/5/24: 86.2 kg (190 lb).    Diet Recall:  (some usual meals, snacks and beverages):  Meal Food    Breakfast Overnight oats made with greek yogurt or almond milk and berries or eggs, toast/crackers   Lunch At home: salad with tofu or homemade protein smoothie made with sun warrior protein powder, collagen powder, spinach, banana and almond milk or if out then jenny, paneer veg khan with rice   Dinner Tofu/beans with " brown rice and steamed/baked veggies   Snacks Not regularly but Occas hummus and veggies (carrots, cucumbers, peppers)   Beverages 2 cups black coffee with ~1 tsp sugar, ~72-92 oz water. Occas tea instead of coffee   Alcohol Intake Most nights has 1 gin and tonic drink.     Frequency of eating/taking out meals: ~1x/week     Labs:    Last Comprehensive Metabolic Panel:  Sodium   Date Value Ref Range Status   06/01/2023 141 136 - 145 mmol/L Final   06/25/2020 137 133 - 144 mmol/L Final     Potassium   Date Value Ref Range Status   06/01/2023 4.0 3.4 - 5.3 mmol/L Final   02/09/2022 3.6 3.4 - 5.3 mmol/L Final   06/25/2020 4.1 3.4 - 5.3 mmol/L Final     Chloride   Date Value Ref Range Status   06/01/2023 106 98 - 107 mmol/L Final   02/09/2022 107 94 - 109 mmol/L Final   06/25/2020 107 94 - 109 mmol/L Final     Carbon Dioxide   Date Value Ref Range Status   06/25/2020 28 20 - 32 mmol/L Final     Carbon Dioxide (CO2)   Date Value Ref Range Status   06/01/2023 24 22 - 29 mmol/L Final   02/09/2022 24 20 - 32 mmol/L Final     Anion Gap   Date Value Ref Range Status   06/01/2023 11 7 - 15 mmol/L Final   02/09/2022 5 3 - 14 mmol/L Final   06/25/2020 2 (L) 3 - 14 mmol/L Final     Glucose   Date Value Ref Range Status   09/05/2024 104 (H) 70 - 99 mg/dL Final   02/16/2022 98 70 - 99 mg/dL Final   06/25/2020 88 70 - 99 mg/dL Final     Comment:     Non Fasting     Urea Nitrogen   Date Value Ref Range Status   06/01/2023 17.7 6.0 - 20.0 mg/dL Final   02/09/2022 15 7 - 30 mg/dL Final   06/25/2020 16 7 - 30 mg/dL Final     Creatinine   Date Value Ref Range Status   09/05/2024 1.21 (H) 0.67 - 1.17 mg/dL Final   06/25/2020 1.19 0.66 - 1.25 mg/dL Final     GFR Estimate   Date Value Ref Range Status   09/05/2024 72 >60 mL/min/1.73m2 Final     Comment:     eGFR calculated using 2021 CKD-EPI equation.   06/25/2020 72 >60 mL/min/[1.73_m2] Final     Comment:     Non  GFR Calc  Starting 12/18/2018, serum creatinine based  estimated GFR (eGFR) will be   calculated using the Chronic Kidney Disease Epidemiology Collaboration   (CKD-EPI) equation.       Calcium   Date Value Ref Range Status   06/01/2023 9.4 8.6 - 10.0 mg/dL Final   06/25/2020 9.1 8.5 - 10.1 mg/dL Final       CBC RESULTS:   Recent Labs   Lab Test 09/05/24  0759   WBC 5.8   RBC 4.99   HGB 15.5   HCT 44.1   MCV 88   MCH 31.1   MCHC 35.1   RDW 13.0        Pertinent Medications/vitamin and mineral supplements:     Current Outpatient Medications   Medication Sig Dispense Refill     ibuprofen (ADVIL/MOTRIN) 200 MG tablet Take 200 mg by mouth every 4 hours as needed for pain.       loratadine (CLARITIN) 10 MG tablet Take 10 mg by mouth daily.       Multiple Vitamins-Minerals (MULTIVITAL PO) Take by mouth.       No current facility-administered medications for this visit.     Food Allergies:  NKFA     Physical Activity:  most mornings exercises using a stationary bike at home or rowing machine and some light weights/dumbbells.    Nutrition Prescription: Recommended general healthful diet     Nutrition Intervention:    Nutrition Education/Counseling:  -Provided general healthful diet nutrition education with tips and suggestions.   -Reviewed the Plate method meal plan with food groups and some example foods in groups.   -Discussed vegetarian diet tips and veg protein sources.  -Reviewed the difference between unsaturated and saturated fats with recommendation to aim for choosing unsaturated fat over saturated fats and discussed examples of both.  -Encouraged patient to eat adequate fruit and vegetable servings per day (at least half plate or more).  -Discussed adequate hydration/recommendations and encouraged pt to drink at least 48 oz-64 oz (6-8 cups) per day.     Answered patient's questions. Patient verbalized understanding of education provided.     Educational Materials Provided:  [employer] Daily Nutrition Plate method handout     Goals:    Can use the Plate method plan  for general guidance on getting balanced meals:   Make half of your plate vegetables and fruit. (Aim for at least 1-2 cups or more of vegetables and/or fruit at each meal).     Make at least 1/4 of your plate lean protein sources at a meal. Here are some lean ovo-lacto vegetarian protein sources as well as fish:  (Fish, seafood, eggs, egg whites, beans/legumes, tofu, tempeh, edamame, low fat dairy such as low fat greek yogurt, low fat yogurt, lowfat milk, low fat cottage cheese, paneer, nuts, seeds.     Make the other 1/4 of your plate whole grain starches/grains/starchy vegetables at meals. Some examples include quinoa, brown rice, wild rice, whole grain corn tortilla or oats starchy vegetables (potatoes and sweet potatoes (eat the skins too for more fiber), winter squash, peas, corn).    *for gluten free diet, avoid barley and choose gluten free oats if having any.     Include some healthy/unsaturated fat servings at a meal (avocados, nut butters, nuts/seeds, olive oil, avocado oil, vegetable oils, flaxseeds, emelina seeds).     Limit fats that are high in saturated fat such as animal fats, butter, ghee, high fat cheese, tropical oils such as coconut oil and palm oil.      *Note: Recommendation is to eat at least 2-3 serving per day of some good sources of calcium (such as lowfat dairy such as lowfat cottage cheese, lowfat yogurt, lowfat milk, paneer, tofu, salmon, sardines, kale, spinach, soybeans, almonds. Also oranges, figs, prunes also have some calcium too.    2. Continue to drink at least 64 oz or more water per day.    Nutrition Monitoring and Evaluation: Will monitor adherence to nutrition recommendations at future RD visits.     Further Medical Nutrition Therapy:  Annual visits    Patient was encouraged to call/contact RD with any further questions.    Lauren Shukla MS, RD, LD          Again, thank you for allowing me to participate in the care of your patient.        Sincerely,        Lauren Shukla RD

## 2024-09-05 NOTE — PATIENT INSTRUCTIONS
It was nice meeting you today. Below are the nutrition recommendations we discussed at your visit.    Please let me know if you have any additional questions.    Nutrition Recommendations    Can use the Plate method plan for general guidance on getting balanced meals:   Make half of your plate vegetables and fruit. (Aim for at least 1-2 cups or more of vegetables and/or fruit at each meal).     Make at least 1/4 of your plate lean protein sources at a meal. Here are some lean ovo-lacto vegetarian protein sources as well as fish:  (Fish, seafood, eggs, egg whites, beans/legumes, tofu, tempeh, edamame, low fat dairy such as low fat greek yogurt, low fat yogurt, lowfat milk, low fat cottage cheese, paneer, nuts, seeds.     Make the other 1/4 of your plate whole grain starches/grains/starchy vegetables at meals. Some examples include quinoa, brown rice, wild rice, whole grain corn tortilla or oats starchy vegetables (potatoes and sweet potatoes (eat the skins too for more fiber), winter squash, peas, corn).    *for gluten free diet, avoid barley and choose gluten free oats if having any.     Include some healthy/unsaturated fat servings at a meal (avocados, nut butters, nuts/seeds, olive oil, avocado oil, vegetable oils, flaxseeds, emelina seeds).     Limit fats that are high in saturated fat such as animal fats, butter, ghee, high fat cheese, tropical oils such as coconut oil and palm oil.      *Note: Recommendation is to eat at least 2-3 serving per day of some good sources of calcium (such as lowfat dairy such as lowfat cottage cheese, lowfat yogurt, lowfat milk, paneer, tofu, salmon, sardines, kale, spinach, soybeans, almonds. Also oranges, figs, prunes also have some calcium too.    2. Continue to drink at least 64 oz or more water per day.    Thank you,    Lauren Shukla, MS, RD, LD

## 2024-09-05 NOTE — OUTPATIENT NURSE NOTE
09/05/24 0955   Fitness   Current Fitness Regimen:  ex: peloton 45-60 mins, 32/wk; 2-3 d/wk of 45 min row or 35-40 min strength; pa: foot pt, dog/afternoon walks   Fitness Goals check in on current levels to ensure fitness is maintained with aging   Timeline   Recommended Activity this Week continue current routine, incorporating 1 stretchin session per week   Recommended Minutes per Day this Week 40 Min   Recommended Number of Days this Week 5 Per Day/Per Week   Recommended Activity this Month continue routine   Recommended Duration this Month 40 Min/Hrs   Recommended Frequency this Month 5 Per Day/Per Week   Recommended Activity the Nex 3 Months continue routine   Recommended Duration the Nex 3 Months 40 Min/Hrs   Recommended Frequency the Nex 3 Months:  5 Per Day/Per Week   VO2 Max   VO2MAX:  39.8 ml/kg/min   VO2- max Percentile 65 %   Fitness Level Good    Strength    Strength (Right):  109 ml/kg/min    Strength (Left) 107 ml/kg/min

## 2024-09-05 NOTE — PROGRESS NOTES
History  HPI       COMPREHENSIVE EYE EXAM    In both eyes.  Treatments tried include no treatments.  Pain was noted as 0/10. Additional comments: Comprehensive exam              Comments    Things could be a little sharper with NVA.   Distance vision is good with each eye.       CHUCK Grossman COT 10:24 AM September 5, 2024                   Last edited by Angeli Ashley COT on 9/5/2024 10:24 AM.          Assessment/Plan  (H52.13) Myopia of both eyes  (primary encounter diagnosis)  Comment: Myopic astigmatism with presbyopia both eyes, small change in correction   Plan: REFRACTION         Educated patient on condition and clinical findings. Dispensed spectacle prescription for full time wear. Monitor annually.    (H02.88A,  H02.88B) Meibomian gland dysfunction (MGD) of upper and lower lids of both eyes  Comment: Asymptomatic  Plan:  No treatment indicated at this time. Monitor annually.    Return to clinic in 1 year for comprehensive eye exam.    Complete documentation of historical and exam elements from today's encounter can  be found in the full encounter summary report (not reduplicated in this progress  note). I personally obtained the chief complaint(s) and history of present illness. I  confirmed and edited as necessary the review of systems, past medical/surgical  history, family history, social history, and examination findings as documented by  others; and I examined the patient myself. I personally reviewed the relevant tests,  images, and reports as documented above. I formulated and edited as necessary the  assessment and plan and discussed the findings and management plan with the  patient and family.    Parvez Erazo, KONSTANTIN, FAAO

## 2024-09-05 NOTE — PROGRESS NOTES
[facility] Dermatology Note  Encounter Date: September 5, 2024  Office Visit      Dermatology Problem List:  1. Last FBSE 2/16/2022   ____________________________________________     Assessment & Plan:     # Benign lesions: Multiple benign nevi, solar lentigos, seborrheic keratoses, cherry angiomas. Explained to patient benign nature of lesion. No treatment is necessary at this time unless the lesion changes or becomes symptomatic.   - ABCDs of melanoma were discussed and self skin checks were advised.  - Sun precaution was advised including the use of sun screens of SPF 30 or higher, sun protective clothing, and avoidance of tanning beds.    # Recurrent papular abruption of the bilateral dorsal feet.  My differential diagnosis includes resolving arthropod bites, small prurigo nodules or potentially partially resolving granuloma annulare.  I suspect recurrent arthropod bites during summer months as most likely.  Today we given a prescription for mometasone ointment to be applied once to twice daily for flareups.  He was reassured regarding the benign nature of this recurrent eruption.     Procedures Performed:   None     Follow-up: as needed    Torrey Neves MD  Dermatology Attending    _________________________________      Chief complaint: Skin cancer screening exam    History of present illness:  Sunny is a very pleasant 49-year-old male who is here for a skin cancer screening exam as part of a signature health visit.  He was last seen in our dermatology clinic by Dr. Jeff Sandy in 2022, at which time he had a benign exam.  Today here work reports recurring pink bumps on his dorsal feet which occurred during summer months.  He has several areas which are not highly active today but slow to resolve.  He would like to know with this rashes.  Otherwise he denies any new or changing moles and does not have any areas of nonhealing sores.    Physical exam:  General: Well-appearing, no apparent  distress  Skin: A cutaneous exam of the head, neck, chest, abdomen, back, bilateral upper and lower extremities was performed.  On the bilateral dorsal feet, there are 3 soft, compressible 7 mm light pink domed papulonodules.    - Well healed scar on the left upper arm at prior shave biopsy site with central repigmentation confined to the scar borders.  - There are dome shaped bright red papules on the trunk and extremities.   - Multiple regular brown pigmented macules and papules are identified on the trunk and extremities.   - Scattered brown macules on sun exposed areas.  - There are waxy stuck on tan to brown papules on the trunk and extremities.   - No other lesions of concern on areas examined.

## 2024-09-05 NOTE — PROGRESS NOTES
"[facility] Outpatient Medical Nutrition Therapy      Time Spent:  50 minutes  Session Type:  Initial   Referral Source: [employer] Package/Dr. Derrick Aburto  Reason for RD Visit:   Nutritional counseling     Nutrition Assessment:    Patient is a 49 y.o. male who is here for initial annual visit with Registered Dietitian (RD).    He stated that he and his wife moved to [facility] for his wife's 3-year work assignment. While living there they are mostly eating a vegetarian diet. They will eat some fish, eggs and dairy products. They lived there in [id number], and got sick at that time, so they decided to eat more of a vegetarian diet and so far they have been feeling well. They mostly make meals at home and only eat/take out about 1x/week. His wife has been bringing lunch meal from home as well. She has celiac disease so follows a gluten free diet. His wife joined the visit about care home through. They are interested in general healthful vegetarian diet education and tips.    Patient Active Problem List   Diagnosis    CARDIOVASCULAR SCREENING; LDL GOAL LESS THAN 160     Estimated body mass index is 24.07 kg/m  as calculated from the following:    Height as of an earlier encounter on 9/5/24: 1.892 m (6' 2.5\").    Weight as of an earlier encounter on 9/5/24: 86.2 kg (190 lb).    Diet Recall:  (some usual meals, snacks and beverages):  Meal Food    Breakfast Overnight oats made with greek yogurt or almond milk and berries or eggs, toast/crackers   Lunch At home: salad with tofu or homemade protein smoothie made with sun warrior protein powder, collagen powder, spinach, banana and almond milk or if out then jenny, paneer veg khan with rice   Dinner Tofu/beans with brown rice and steamed/baked veggies   Snacks Not regularly but Occas hummus and veggies (carrots, cucumbers, peppers)   Beverages 2 cups black coffee with ~1 tsp sugar, ~72-92 oz water. Occas tea instead of coffee   Alcohol Intake Most nights has 1 gin and " tonic drink.     Frequency of eating/taking out meals: ~1x/week     Labs:    Last Comprehensive Metabolic Panel:  Sodium   Date Value Ref Range Status   06/01/2023 141 136 - 145 mmol/L Final   06/25/2020 137 133 - 144 mmol/L Final     Potassium   Date Value Ref Range Status   06/01/2023 4.0 3.4 - 5.3 mmol/L Final   02/09/2022 3.6 3.4 - 5.3 mmol/L Final   06/25/2020 4.1 3.4 - 5.3 mmol/L Final     Chloride   Date Value Ref Range Status   06/01/2023 106 98 - 107 mmol/L Final   02/09/2022 107 94 - 109 mmol/L Final   06/25/2020 107 94 - 109 mmol/L Final     Carbon Dioxide   Date Value Ref Range Status   06/25/2020 28 20 - 32 mmol/L Final     Carbon Dioxide (CO2)   Date Value Ref Range Status   06/01/2023 24 22 - 29 mmol/L Final   02/09/2022 24 20 - 32 mmol/L Final     Anion Gap   Date Value Ref Range Status   06/01/2023 11 7 - 15 mmol/L Final   02/09/2022 5 3 - 14 mmol/L Final   06/25/2020 2 (L) 3 - 14 mmol/L Final     Glucose   Date Value Ref Range Status   09/05/2024 104 (H) 70 - 99 mg/dL Final   02/16/2022 98 70 - 99 mg/dL Final   06/25/2020 88 70 - 99 mg/dL Final     Comment:     Non Fasting     Urea Nitrogen   Date Value Ref Range Status   06/01/2023 17.7 6.0 - 20.0 mg/dL Final   02/09/2022 15 7 - 30 mg/dL Final   06/25/2020 16 7 - 30 mg/dL Final     Creatinine   Date Value Ref Range Status   09/05/2024 1.21 (H) 0.67 - 1.17 mg/dL Final   06/25/2020 1.19 0.66 - 1.25 mg/dL Final     GFR Estimate   Date Value Ref Range Status   09/05/2024 72 >60 mL/min/1.73m2 Final     Comment:     eGFR calculated using 2021 CKD-EPI equation.   06/25/2020 72 >60 mL/min/[1.73_m2] Final     Comment:     Non  GFR Calc  Starting 12/18/2018, serum creatinine based estimated GFR (eGFR) will be   calculated using the Chronic Kidney Disease Epidemiology Collaboration   (CKD-EPI) equation.       Calcium   Date Value Ref Range Status   06/01/2023 9.4 8.6 - 10.0 mg/dL Final   06/25/2020 9.1 8.5 - 10.1 mg/dL Final       CBC  RESULTS:   Recent Labs   Lab Test 09/05/24  0759   WBC 5.8   RBC 4.99   HGB 15.5   HCT 44.1   MCV 88   MCH 31.1   MCHC 35.1   RDW 13.0        Pertinent Medications/vitamin and mineral supplements:     Current Outpatient Medications   Medication Sig Dispense Refill    ibuprofen (ADVIL/MOTRIN) 200 MG tablet Take 200 mg by mouth every 4 hours as needed for pain.      loratadine (CLARITIN) 10 MG tablet Take 10 mg by mouth daily.      Multiple Vitamins-Minerals (MULTIVITAL PO) Take by mouth.       No current facility-administered medications for this visit.     Food Allergies:  NKFA     Physical Activity:  most mornings exercises using a stationary bike at home or rowing machine and some light weights/dumbbells.    Nutrition Prescription: Recommended general healthful diet     Nutrition Intervention:    Nutrition Education/Counseling:  -Provided general healthful diet nutrition education with tips and suggestions.   -Reviewed the Plate method meal plan with food groups and some example foods in groups.   -Discussed vegetarian diet tips and veg protein sources.  -Reviewed the difference between unsaturated and saturated fats with recommendation to aim for choosing unsaturated fat over saturated fats and discussed examples of both.  -Encouraged patient to eat adequate fruit and vegetable servings per day (at least half plate or more).  -Discussed adequate hydration/recommendations and encouraged pt to drink at least 48 oz-64 oz (6-8 cups) per day.     Answered patient's questions. Patient verbalized understanding of education provided.     Educational Materials Provided:   [employer] Daily Nutrition Plate method handout     Goals:    Can use the Plate method plan for general guidance on getting balanced meals:   Make half of your plate vegetables and fruit. (Aim for at least 1-2 cups or more of vegetables and/or fruit at each meal).     Make at least 1/4 of your plate lean protein sources at a meal. Here are some lean  ovo-lacto vegetarian protein sources as well as fish:  (Fish, seafood, eggs, egg whites, beans/legumes, tofu, tempeh, edamame, low fat dairy such as low fat greek yogurt, low fat yogurt, lowfat milk, low fat cottage cheese, paneer, nuts, seeds.     Make the other 1/4 of your plate whole grain starches/grains/starchy vegetables at meals. Some examples include quinoa, brown rice, wild rice, whole grain corn tortilla or oats starchy vegetables (potatoes and sweet potatoes (eat the skins too for more fiber), winter squash, peas, corn).    *for gluten free diet, avoid barley and choose gluten free oats if having any.     Include some healthy/unsaturated fat servings at a meal (avocados, nut butters, nuts/seeds, olive oil, avocado oil, vegetable oils, flaxseeds, emelina seeds).     Limit fats that are high in saturated fat such as animal fats, butter, ghee, high fat cheese, tropical oils such as coconut oil and palm oil.      *Note: Recommendation is to eat at least 2-3 serving per day of some good sources of calcium (such as lowfat dairy such as lowfat cottage cheese, lowfat yogurt, lowfat milk, paneer, tofu, salmon, sardines, kale, spinach, soybeans, almonds. Also oranges, figs, prunes also have some calcium too.    2. Continue to drink at least 64 oz or more water per day.    Nutrition Monitoring and Evaluation: Will monitor adherence to nutrition recommendations at future RD visits.     Further Medical Nutrition Therapy:  Annual visits    Patient was encouraged to call/contact RD with any further questions.    Lauren Shukla, MS, RD, LD

## 2024-09-05 NOTE — NURSING NOTE
Chief Complaint   Patient presents with    Physical     Patient is here for annual physical     Jodie Cortes CMA 8:16 AM on 9/5/2024

## 2024-09-05 NOTE — PROGRESS NOTES
Sunny rPuitt comes into clinic today at the request of Dr. Derrick Aburto Ordering Provider for EKG.        This service provided today was under the supervising provider of the day Dr. Derrick Aburto, who was available if needed.    Jodie Holley, [facility]

## 2024-09-05 NOTE — PATIENT INSTRUCTIONS
"Caden Correa,     It was great to meet you today at [facility]. Starting with strength, your highest trials on the hand  dynamometer were 109 lbs on the right, and 107 lbs on the left, placing you in the 40th and 62nd percentile for men your age. I believe that these reflect your current strength routine and the work you've been doing. ACSM recommends 2-3 resistance strength sessions per week for adults, and you can find more information about what those sessions should look like here or on ACSM's website.    For your treadmill test, your VO2 max was 39.8 ml/kg/min, which places you in the 65th percentile for men you age. This reflects the biking and occasional rowing that you do. If you'd like to use heart rate to guide your aerobic activities, here are some based off your test today:  <90 bpm: warm-up   bpm: easy  113-135 bpm: moderate  136-157 bpm: \"tempo\"  158+ bpm: vigorous    Overall, you are in good health and I have no major recommendation for you. It may be good for you to incorporate one to two stretching or yoga sessions a week to maintain your flexibility and mobility as you age. Other than that, continue your current routine or increase if you'd like.    If you have any questions for me, please do not hesitate to reach out.  Eder,  Bree Chamberlain  Exercise Physiology  [email or web address]  "

## 2024-09-05 NOTE — NURSING NOTE
Chief Complaints and History of Present Illnesses   Patient presents with    COMPREHENSIVE EYE EXAM     Comprehensive exam      Chief Complaint(s) and History of Present Illness(es)       COMPREHENSIVE EYE EXAM              Laterality: both eyes    Treatments tried: no treatments    Pain scale: 0/10    Comments: Comprehensive exam               Comments    Things could be a little sharper with NVA.   Distance vision is good with each eye.       CHUCK Grossman COT 10:24 AM September 5, 2024

## 2024-09-05 NOTE — LETTER
9/5/2024       RE: Sunny Pruitt  [address] [place] [address]     Dear Colleague,    Thank you for referring your patient, Sunny Pruitt, to the [facility] [place] at [facility]. Please see a copy of my visit note below.    [facility] Dermatology Note  Encounter Date: September 5, 2024  Office Visit      Dermatology Problem List:  1. Last FBSE 2/16/2022   ____________________________________________     Assessment & Plan:     # Benign lesions: Multiple benign nevi, solar lentigos, seborrheic keratoses, cherry angiomas. Explained to patient benign nature of lesion. No treatment is necessary at this time unless the lesion changes or becomes symptomatic.   - ABCDs of melanoma were discussed and self skin checks were advised.  - Sun precaution was advised including the use of sun screens of SPF 30 or higher, sun protective clothing, and avoidance of tanning beds.    # Recurrent papular abruption of the bilateral dorsal feet.  My differential diagnosis includes resolving arthropod bites, small prurigo nodules or potentially partially resolving granuloma annulare.  I suspect recurrent arthropod bites during summer months as most likely.  Today we given a prescription for mometasone ointment to be applied once to twice daily for flareups.  He was reassured regarding the benign nature of this recurrent eruption.     Procedures Performed:   None     Follow-up: as needed    Torrey Neves MD  Dermatology Attending    _________________________________      Chief complaint: Skin cancer screening exam    History of present illness:  Sunny is a very pleasant 49-year-old male who is here for a skin cancer screening exam as part of a [facility] health visit.  He was last seen in our dermatology clinic by Dr. Jeff Sandy in 2022, at which time he had a benign exam.  Today here work reports recurring pink bumps on his dorsal feet  which occurred during summer months.  He has several areas which are not highly active today but slow to resolve.  He would like to know with this rashes.  Otherwise he denies any new or changing moles and does not have any areas of nonhealing sores.    Physical exam:  General: Well-appearing, no apparent distress  Skin: A cutaneous exam of the head, neck, chest, abdomen, back, bilateral upper and lower extremities was performed.  On the bilateral dorsal feet, there are 3 soft, compressible 7 mm light pink domed papulonodules.    - Well healed scar on the left upper arm at prior shave biopsy site with central repigmentation confined to the scar borders.  - There are dome shaped bright red papules on the trunk and extremities.   - Multiple regular brown pigmented macules and papules are identified on the trunk and extremities.   - Scattered brown macules on sun exposed areas.  - There are waxy stuck on tan to brown papules on the trunk and extremities.   - No other lesions of concern on areas examined.       Again, thank you for allowing me to participate in the care of your patient.      Sincerely,    Torrey Neves MD

## 2024-09-06 LAB
EXPTIME-PRE: 7.29 SEC
FEF2575-%PRED-PRE: 101 %
FEF2575-PRE: 3.73 L/SEC
FEF2575-PRED: 3.68 L/SEC
FEFMAX-%PRED-PRE: 94 %
FEFMAX-PRE: 10.19 L/SEC
FEFMAX-PRED: 10.8 L/SEC
FEV1-%PRED-PRE: 108 %
FEV1-PRE: 4.49 L
FEV1FEV6-PRE: 77 %
FEV1FEV6-PRED: 80 %
FEV1FVC-PRE: 76 %
FEV1FVC-PRED: 79 %
FIFMAX-PRE: 6.69 L/SEC
FVC-%PRED-PRE: 111 %
FVC-PRE: 5.89 L
FVC-PRED: 5.29 L

## 2024-09-06 NOTE — PROGRESS NOTES
History and Physical Examination     SUBJECTIVE: Chief concern: preventive health review.     Past Medical History:  1.  Congenital pes cavus, status post right (2015 and left (2016) osteotomy procedures  2.  Status post right foot surgery to address calcaneal cuboid joint nonunion, 2016  3.  Right navicular cuneiform arthritis  4.  Status post left foot peroneal subluxation open reduction/internal fixation, 2019  5.  Status post right (2015) and left (2018) arthroscopic partial meniscectomies.  6.  Allergic rhinitis  7.  Status post hydrocelectomy, 12/2011  8.  History of maxillary sinusitis, 12/2021  9.  History of subclinical hypothyroidism  10.  History of benign paroxysmal positional vertigo, 2/2022  11.  Status post vasectomy, 8/2022     Adverse Drug Reactions: None.     Current Medications:  Daily multivitamin supplement  Ibuprofen, 200 mg as needed  Loratadine, 10 mg daily as needed, primarily during spring months.  Calcium plus vitamin D3 supplement, one tablet daily  Dimenhydrinate (Dramamine), as needed for motion sickness     Habits:  Tobacco: None since 2010; history of 5 pack-years (10 years ×1/2 pack per day) of previous use  Alcohol: 1 serving, 6 days per week  Caffeine: 2-3 servings of coffee per day     Social History:  to Ed Reis, an [place] native and executive at [employer].  Sunny is a native of [place] who met Ed while they were graduate students at the [employer] while he completed his PhD in Nondenominational and she completed a master's degree.  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  In early 2024, they moved back to [facility] for a 3-year assignment.  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  He enjoys outdoor activities, including cycling, cross-country skiing, and walking.  Currently, he exercises for 45 minutes per day, rotating workouts among free weights, stationary biking, and machine rowing, followed by stretching sessions.     Family History: Father is in good health at age 92.  Mother is 89, with history of dyslipidemia, skin cancer, and TIA.  A sister 1 year his senior is in good health, with history of malignant chordoma of the coccyx for which she underwent surgery in approximately 2015.  A biological daughter for whose mother he was a sperm donor has scoliosis and unspecified arthritis at age 19.  Maternal grandmother  at age 87, with history of TIAs and dementia at advanced age.  Maternal grandfather  at age 74; details of health history not known.  Paternal grandmother  at advanced age.  Paternal grandfather  at age 84, with history of type 2 diabetes mellitus and dementia, diagnosed at age 82.     Review of Systems: Since moving to [facility] in early , Sunny has noted a 1-week history of influenza symptoms; a 3-day history of gastroenteritis symptoms; and a \"severe cold\"; he questions whether this pattern might suggest a compromised immune system.  He does state that his diet and activity levels changed dramatically at the time of his move, during which.  He noted significant stress; currently he denies significant stress and feels that his diet and exercise regimen have improved.  Longstanding difficulties with motion sickness, relieved with use of dimenhydrinate.  Following the multiple surgical procedures on his feet, he has noted marked improvement of previous problems with pain and stiffness; he believes that symptoms fully resolved after he switched from wearing shoes to exclusively wearing sandals or flip-flops after moving to [facility].  Previously noted mild tinnitus has not recurred over recent months.  Colonoscopy was " negative in 3/2022.  IPV was administered in 1/2012.  Most recent tetanus (TdAP) was administered in 1/2012; Td was administered in 2/2022.  Hepatitis A/Hepatitis B vaccination series was completed in 11/2018.  Typhoid IM was administered in 1/2012.  Covid (Pfizer) vaccinations were administered in 4/2021 (x 2), 12/2021, 10/2022, and 10/2023.  Recombinant zoster vaccination series was completed in 10/2023.  Remainder of complete review of systems was negative.     OBJECTIVE:     Vital signs: Height 74.5 inches.  Weight 190 pounds.  Blood pressure 114/72 on average of 3 automated readings.  Heart rate 53, respiratory rate 16.  Temperature 97.6 degrees.  O2 saturation 99% on room air.  General: Alert, neatly dressed and groomed, in no acute distress.  HEENT: Atraumatic and normocephalic. Eyelids, pupils, and conjunctivae appeared normal. Lips, teeth and gums appear normal.  Oropharynx showed moist mucous membranes, without exudate or erythema.  Neck: Supple, without thyromegaly, mass, or bruit. No cervical or supraclavicular lymphadenopathy.  Back: No spinal or costovertebral angle tenderness.  Chest: Clear to auscultation and percussion. Normal respiratory effort.  Cardiovascular: No jugular venous distention. Regular rate and rhythm, normal S1, S2 without murmur.  Abdomen: Bowel sounds positive; soft, nontender, without rebound, guarding, hepatosplenomegaly or mass.  Extremities: No cyanosis or edema.  Genitalia: Normal male genitalia, without scrotal mass or hernia. No inguinal lymphadenopathy.  Rectal: Examination was offered but declined.  Skin: Examination was deferred; full evaluation was completed earlier in day through dermatology clinic.  Neurologic: Cranial nerves II-XII were grossly intact. Sensory and motor examinations were normal. Normal gait.  Mini-cog score was 5/5.    Psychiatric: Alert and oriented ×3. Normal affect. Judgment and insight intact.  PHQ-2 score was 0.  MICHAEL-7 score 0.     Creatinine  1.21 (0.67-1.17), Cystatin C 1.1 (0.6-1.0), GFR 72 (using creatinine) and 71 (using Cystatin C), alkaline phosphatase 71, ALT 25, cholesterol 167, triglycerides 78, HDL 52, LDL 99, cholesterol/HDL 3.2, glucose 104, PSA 0.57, TSH 5.58, 25-hydroxy vitamin D 25, white blood cell count 5800, hemoglobin 15.5, platelets 174,000, HIV nonreactive, urinalysis notable for specific gravity 1.000; no evidence of blood or protein.  Urine albumin/creatinine pending.     EKG was unremarkable.  Spirometry showed an FEV1 of 4.49, with an FVC of 5.89; readings were 108 % and 111% of predicted values, respectively.    An audiogram showed normal hearing on the right with normal sloping to mild sensorineural hearing loss rising to normal hearing on the left.  Thresholds were stable relative to 2/2022.     Preliminary DEXA results showed low bone density, with most negative and valid T-score of -1.6 at the level of the right and left femoral necks.    Body composition analysis showed 20.4% fat (26th percentile); body mass index was 24.1.  Reading from 2/2022 showed 20.5% fat (30th percentile).     ASSESSMENT:     1.  Impaired fasting glucose.  We discussed the implications of this diagnosis, along with the importance of additional exercise, maintenance of ideal weight, and adherence to a modified diet, the elements of which were reviewed in detail.  He was advised to arrange measurement of fasting glucose levels at least annually.     2.  Low bone density.  We reviewed the implications of this finding, along with the importance of regular weight-bearing exercise; increased vitamin D3 supplementation (I recommended that he increase daily intake by 25 mcg), and daily calcium intake of 1200 mg, preferably dietary sources.  Additional tests were arranged to exclude underlying causes of bone loss; findings included a normal calcium level 9.4 and a minimally depressed phosphorus level 2.4 (normal range 2.5-4.5); testosterone and SPEP results  are pending.  He was advised to submit a 24-hour urine specimen for evaluation of calcium excretion.  Further recommendations will be based on results of pending studies.     3.  Subclinical hypothyroidism.  Noted previously, with no active cold intolerance, fatigue, unexplained weight gain, or constipation.  We discussed the implications of this diagnosis and the importance of measuring a TSH level within 12 months.     4.  Asymmetric hearing loss.  Relatively minimal asymmetry with stable hearing thresholds relative to 2022.  In the event of more pronounced asymmetry, ENT consultation will be arranged.    5.  Elevated serum creatinine.  Minimal elevations of creatinine and Cystatin C, with acceptable GFR.  Urine albumin/creatinine results pending.  Sunny will be advised to avoid use of nonsteroidal inflammatory drugs including ibuprofen and naproxen; avoid dehydration, with use of fluid and electrolyte replacement in the event of substantial losses from sweat given [place]; and arrange repeat measurement of a creatinine level after approximately 3 months.    6.  Preventive care.  I recommended influenza and COVID vaccinations this fall, with a southern hemisphere influenza vaccination in approximately May, 2025 given current residence in [facility].  Using the PREVENT algorithm from the American Heart Association, his estimated 10-year/30-year risks are as follows: Cardiovascular disease, 2.4% / 16.6%; atherosclerotic cardiovascular disease, 1.5% / 9.9%; and heart failure, 1.0% / 8.1%.  We reviewed the thresholds at which statin and aspirin treatment are recommended, along with the importance of maintaining ideal weight, continuing a regimen of regular exercise, and following a modified diet, the elements of which were reviewed in detail.  We reviewed the debate regarding the benefit of multivitamin supplements and the importance of selecting a product that does not contain  iron should he choose to continue.  I recommended that he increase his sleep volume from his current level of 7 hours per night.  We discussed lifestyle measures that may facilitate cognitive health, including limiting consumption of alcohol; continuing a regimen of regular exercise; regularly engaging in social activities and correcting hearing loss if interfering with socialization; engaging in intellectually stimulating activities that require new learning; and the potential benefit of fisetin, found in highest concentration and strawberries.     PLAN: See above.     ~SRT

## 2024-09-07 LAB — TESTOST SERPL-MCNC: 525 NG/DL (ref 240–950)

## 2024-09-08 LAB
ATRIAL RATE - MUSE: 45 BPM
DIASTOLIC BLOOD PRESSURE - MUSE: NORMAL MMHG
INTERPRETATION ECG - MUSE: NORMAL
P AXIS - MUSE: 66 DEGREES
PR INTERVAL - MUSE: 192 MS
QRS DURATION - MUSE: 100 MS
QT - MUSE: 442 MS
QTC - MUSE: 382 MS
R AXIS - MUSE: 60 DEGREES
SYSTOLIC BLOOD PRESSURE - MUSE: NORMAL MMHG
T AXIS - MUSE: 37 DEGREES
VENTRICULAR RATE- MUSE: 45 BPM

## (undated) DEVICE — SPECIMEN CONTAINER 3OZ W/FORMALIN 59901

## (undated) DEVICE — KIT ENDO FIRST STEP DISINFECTANT 200ML W/POUCH EP-4

## (undated) DEVICE — SUCTION MANIFOLD NEPTUNE 2 SYS 1 PORT 702-025-000

## (undated) DEVICE — SOL WATER IRRIG 500ML BOTTLE 2F7113

## (undated) DEVICE — GOWN IMPERVIOUS 2XL BLUE

## (undated) DEVICE — KIT ENDO TURNOVER/PROCEDURE CARRY-ON 101822

## (undated) DEVICE — TUBING SUCTION 12"X1/4" N612

## (undated) RX ORDER — LIDOCAINE HYDROCHLORIDE 20 MG/ML
INJECTION, SOLUTION INFILTRATION; PERINEURAL
Status: DISPENSED
Start: 2022-08-18